# Patient Record
Sex: FEMALE | Employment: OTHER | URBAN - METROPOLITAN AREA
[De-identification: names, ages, dates, MRNs, and addresses within clinical notes are randomized per-mention and may not be internally consistent; named-entity substitution may affect disease eponyms.]

---

## 2017-06-12 ENCOUNTER — HOSPITAL ENCOUNTER (OUTPATIENT)
Dept: ONCOLOGY | Age: 82
Discharge: HOME | End: 2017-06-12

## 2017-06-12 ENCOUNTER — OFFICE VISIT (OUTPATIENT)
Dept: ONCOLOGY | Age: 82
End: 2017-06-12

## 2017-06-12 DIAGNOSIS — D46.9 MYELODYSPLASTIC SYNDROME (HCC): ICD-10-CM

## 2017-06-12 DIAGNOSIS — D63.1 ANEMIA OF CHRONIC RENAL FAILURE, STAGE 3 (MODERATE) (HCC): ICD-10-CM

## 2017-06-12 DIAGNOSIS — D72.819 CHRONIC LEUKOPENIA: ICD-10-CM

## 2017-06-12 DIAGNOSIS — D46.9 MYELODYSPLASTIC SYNDROME (HCC): Primary | ICD-10-CM

## 2017-06-12 DIAGNOSIS — E55.9 VITAMIN D DEFICIENCY: ICD-10-CM

## 2017-06-12 DIAGNOSIS — N18.30 ANEMIA OF CHRONIC RENAL FAILURE, STAGE 3 (MODERATE) (HCC): ICD-10-CM

## 2017-06-12 LAB
BASO+EOS+MONOS # BLD AUTO: 0.2 K/UL (ref 0–2.3)
BASO+EOS+MONOS # BLD AUTO: 5 % (ref 0.1–17)
DIFFERENTIAL METHOD BLD: ABNORMAL
ERYTHROCYTE [DISTWIDTH] IN BLOOD BY AUTOMATED COUNT: 12.1 % (ref 11.5–14.5)
HCT VFR BLD AUTO: 32.1 % (ref 36–48)
HGB BLD-MCNC: 10.5 G/DL (ref 12–16)
LYMPHOCYTES # BLD AUTO: 37 % (ref 14–44)
LYMPHOCYTES # BLD: 1.3 K/UL (ref 1.1–5.9)
MCH RBC QN AUTO: 33.5 PG (ref 25–35)
MCHC RBC AUTO-ENTMCNC: 32.7 G/DL (ref 31–37)
MCV RBC AUTO: 102.6 FL (ref 78–102)
NEUTS SEG # BLD: 2 K/UL (ref 1.8–9.5)
NEUTS SEG NFR BLD AUTO: 58 % (ref 40–70)
PLATELET # BLD AUTO: 157 K/UL (ref 140–440)
RBC # BLD AUTO: 3.13 M/UL (ref 4.1–5.1)
WBC # BLD AUTO: 3.5 K/UL (ref 4.5–13)

## 2017-06-12 NOTE — PATIENT INSTRUCTIONS
Anemia From Chronic Disease: Care Instructions  Your Care Instructions    Anemia is a low level of red blood cells, which carry oxygen from your lungs to the rest of your body. Sometimes when you have a long-term (chronic) disease such as kidney disease, arthritis, diabetes, cancer, or an infection, your body does not make enough red blood cells. Follow-up care is a key part of your treatment and safety. Be sure to make and go to all appointments, and call your doctor if you are having problems. It's also a good idea to know your test results and keep a list of the medicines you take. How can you care for yourself at home? · Follow your doctor's instructions to treat the chronic condition that is causing the anemia. · Take your medicine to treat your chronic condition exactly as prescribed. Call your doctor if you think you are having a problem with your medicine. · Take your medicine for anemia exactly as prescribed. Call your doctor if you think you are having a problem with your medicine. Medicines to increase the number of red blood cells (such as epoetin or darbepoetin) may be given as an injection. ¨ If you miss a dose, take it as soon as you can, unless it is almost time for your next dose. In that case, get back on your regular schedule and take only one dose. ¨ Do not freeze this medicine. Store it in the refrigerator. Do not shake the bottle before you prepare the shot. · Keep all your appointments for blood tests to check on your hemoglobin levels. When should you call for help? Call 911 anytime you think you may need emergency care. For example, call if:  · You passed out (lost consciousness). · You have symptoms of a heart attack, such as:  ¨ Chest pain or pressure. ¨ Sweating. ¨ Shortness of breath. ¨ Nausea or vomiting. ¨ Pain that spreads from the chest to the neck, jaw, or one or both shoulders or arms. ¨ Dizziness or lightheadedness. ¨ A fast or uneven pulse.   After calling 911, chew 1 adult-strength aspirin. Wait for an ambulance. Do not try to drive yourself. · You have a seizure. Call your doctor now or seek immediate medical care if:  · You have side effects of epoetin and darbepoetin, such as:  ¨ A headache. ¨ A fever. ¨ Diarrhea, nausea, or vomiting. ¨ Fatigue. ¨ Muscle or joint pain. ¨ A skin rash. · Your fatigue and weakness continue or get worse. Watch closely for changes in your health, and be sure to contact your doctor if you have any problems. Where can you learn more? Go to http://javier-marylu.info/. Enter E502 in the search box to learn more about \"Anemia From Chronic Disease: Care Instructions. \"  Current as of: October 13, 2016  Content Version: 11.2  © 9576-7772 [employer]. Care instructions adapted under license by [employer] (which disclaims liability or warranty for this information). If you have questions about a medical condition or this instruction, always ask your healthcare professional. [place] [address] any warranty or liability for your use of this information.

## 2017-06-12 NOTE — PROGRESS NOTES
Hematology/Oncology  Progress Note    Name: Steve   Date: 2017  : 4/3/1928  Primary Care Provider: Bard Kajal MD        Ms. Erling Gilford is a 80y.o. year old female who is following up for her Anemia associated with CRF: Stage 3, Vitamin Deficiency and Leukopenia. Current Therapy: Vitamin D 50,000 units, Procrit 60,000 units for hct < 30%. Subjective:      is in the office for a follow-up. Since her last clinic visit she has been doing well. Her appetite has improved and is reasonably well. She denies any significant fatigue or weakness. She denies recent fever or infections. Her weight remains stable. SHe does report some mild arthritis pain in her knees. She is continuing to use her cane for mobility support. She has no new concerns or complaints to report. The past medical, surgical and social history has been reviewed and remains unchanged. Past Medical History:   Diagnosis Date    Chronic diastolic heart failure (HCC)     Stable, mild edema, exertional SOB    Edema     Mild    Essential hypertension, benign     Better controlled    Hypertension     Shortness of breath     Improving, CHF better     Past Surgical History:   Procedure Laterality Date    HX GYN      hyst; abdominal hysterectomy    HX OTHER SURGICAL      goiter removed     Social History     Social History    Marital status:      Spouse name: N/A    Number of children: N/A    Years of education: N/A     Occupational History    Not on file.      Social History Main Topics    Smoking status: Never Smoker    Smokeless tobacco: Never Used    Alcohol use No    Drug use: No    Sexual activity: Not on file     Other Topics Concern    Not on file     Social History Narrative     Family History   Problem Relation Age of Onset    Diabetes Other     Hypertension Other      Positive family history for essential hypertension     Current Outpatient Prescriptions   Medication Sig Dispense Refill    ergocalciferol (ERGOCALCIFEROL) 50,000 unit capsule Take 1 Cap by mouth every seven (7) days. 12 Cap 0    nitrofurantoin (MACROBID) 100 mg capsule Take 1 Cap by mouth two (2) times daily (with meals). 14 Cap 0    aspirin (ASPIRIN LOW-STRENGTH) 81 mg chewable tablet Take 81 mg by mouth daily.  Hydrochlorothiazide 12.5 mg tablet Take 12.5 mg by mouth daily. PRN leg swelling.  valsartan-hydrochlorothiazide (DIOVAN HCT) 160-25 mg per tablet Take 1 Tab by mouth daily. Indications: HYPERTENSION      multivitamin (ONE A DAY) tablet Take 1 Tab by mouth daily. Indications: VITAMIN DEFICIENCY PREVENTION      diazepam (VALIUM) 2 mg tablet Take 2 mg by mouth as needed. Review of Systems  General :With exception of aforementioned;there is no physical distress evident. Ophthalmic:the patient denies having any visual impairment or eye discomfort. ENT: there are no abnormalities reported. Allergy and Immunology:the patient denies having any seasonal allergies or allergies to medications other than those already outlined above. Hematological and Lymphatic: the patient denies having any bruising, bleeding or lymphadenopathy. Endocrine: the patient denies having any heat or cold intolerance. There is no history of diabetes or thyroid disorders. Breast: the patient denies having any history of breast mass or lumps. Respiratory:the patient denies having any cough, shortness of breath, or dyspnea on exertion. Cardiovascular: there are no complaints of chest pain, palpitations, chest pounding, or dyspnea on exertion. Gastrointestinal: the patient denies having nausea, emesis, diarrhea, constipation, or blood in the stool. Genito-Urinary: the patient denies having urinary urgency, frequency, or dysuria. Musculoskeletal: with the exception of mild arthralgias the patient has no other musculoskeletal complaints.    Neurological:  denies having any numbness, tingling, or neurologic deficits. Dermatological: patient denies having any unexplained rash, skin ulcerations, or hives. Objective: There were no vitals taken for this visit. Pain Score: 1/10    Physical Exam:   General: Well appearing, in NAD  Skin: examination of the skin reveals no bruising, rash or petechiae  HEENT: Normocephalic, atraumatic. Conjunctiva and sclera are clear. Pupils are equal, round and reactive to light. EOMs are intact. ENT without oral mucosal lesions, stomatitis or thrush  Neck: supple without lymphadenopathy, JVD or thyromegaly  Lymphatics: no palpable cervical, supraclavicular, axillary or inguinal lymphadenopathy  Lungs: clear breath sounds bilaterally, no rhonchi or wheezes noted  Heart: Regular rate and rhythm, no murmurs, rubs or gallops, S1-S2 noted. Positive peripheral pulses bilaterally upper and lower extremities  Abdomen: soft, non-tender, non-distended, no HSM, positive bowel sounds  Extremities: without clubbing, cyanosis or edema  Neurologic: no focal deficits, steady gait, Alert and oriented x 3. Psychologic: mood and affect are appropriate, no anxiety or depression noted    Laboratory Data:     Results for orders placed or performed during the hospital encounter of 06/12/17   CBC WITH 3 PART DIFF     Status: Abnormal   Result Value Ref Range Status    WBC 3.5 (L) 4.5 - 13.0 K/uL Final    RBC 3.13 (L) 4.10 - 5.10 M/uL Final    HGB 10.5 (L) 12.0 - 16 g/dL Final    HCT 32.1 (L) 36 - 48 % Final    .6 (H) 78 - 102 FL Final    MCH 33.5 25.0 - 35.0 PG Final    MCHC 32.7 31 - 37 g/dL Final    RDW 12.1 11.5 - 14.5 % Final    PLATELET 428 999 - 064 K/uL Final    NEUTROPHILS 58 40 - 70 % Final    MIXED CELLS 5 0.1 - 17 % Final    LYMPHOCYTES 37 14 - 44 % Final    ABS. NEUTROPHILS 2.0 1.8 - 9.5 K/UL Final    ABS. MIXED CELLS 0.2 0.0 - 2.3 K/uL Final    ABS. LYMPHOCYTES 1.3 1.1 - 5.9 K/UL Final     Comment: Test performed at [address]  Results Reviewed by Medical Director. DF AUTOMATED   Final            Patient Active Problem List   Diagnosis Code    Essential hypertension, benign I10    Chronic diastolic heart failure (HCC) I50.32    Edema R60.9    Shortness of breath R06.02    Vitamin D deficiency E55.9    Anemia associated with chronic renal failure D63.1    Myelodysplastic syndrome (HCC) D46.9    Chronic leukopenia D72.819    Anemia of chronic renal failure, stage 3 (moderate) N18.3, D63.1         Assessment:     1. Myelodysplastic syndrome ([address]    2. Vitamin D deficiency    3. Chronic leukopenia    4. Anemia of chronic renal failure, stage 3 (moderate)        Plan:   Anemia associated with chronic kidney failure and myelodysplastic syndrome : I have explained to patient that her CBC from today reveals that her  WBC is stable at 3.5 with a hemoglobin of 10.5 g/dL and hematocrit of 32.1%. She will not require a Procrit injection at this time. I will check  CMP,Iron, and Ferritin level today. Vitamin D deficiency: Patient has completed Vit-D 50,000 units supplement  for her to take 1 po every week X 12 weeks during her previous visit. She previously completed her required therapy and the most recent vitamin D level was 37.4. I will recheck her values at this time and if they are sub-therapeutic then she will be restarted on vitamin D 50,000 units weekly for 12 weeks. Leukopenia: I have explained to patient that her WBC count is currently stable at 3.5. Her absolute neutrophil count is normal at 2 and the absolute lymphocyte count is normal at 1.3. Again, I have explained to the patient that therapeutic intervention is only required if the total WBC count is below 1 or if the absolute neutrophil count declines below 0.5. She will return to clinic for follow-up visit in 12 weeks. Follow-up Disposition:  Return in about 3 months (around 9/12/2017).    Orders Placed This Encounter    COMPLETE CBC & AUTO DIFF WBC    InHouse CBC ([facility]) Standing Status:   Future     Number of Occurrences:   1     Standing Expiration Date:   6/19/2017       Lucia Fernandez MD  6/12/2017

## 2017-06-12 NOTE — MR AVS SNAPSHOT
Visit Information Date & Time Provider Department Dept. Phone Encounter #  
 6/12/2017  1:30 PM Jailene Rose MD [facility] Medical Oncology [phone] [id number] Follow-up Instructions Return in about 3 months (around 9/12/2017). Your Appointments 9/13/2017 11:45 AM  
Office Visit with Jailene Rose MD  
[address]  Oncology [facility]) Appt Note: OV  
 [address], [address] [place] [address] Upcoming Health Maintenance Date Due DTaP/Tdap/Td series (1 - Tdap) 4/3/1949 ZOSTER VACCINE AGE 60> 4/3/1988 GLAUCOMA SCREENING Q2Y 4/3/1993 OSTEOPOROSIS SCREENING (DEXA) 4/3/1993 Pneumococcal 65+ High/Highest Risk (1 of 2 - PCV13) 4/3/1993 MEDICARE YEARLY EXAM 4/3/1993 INFLUENZA AGE 9 TO ADULT 8/1/2017 Allergies as of 6/12/2017  Review Complete On: 6/12/2017 By: Jailene Rose MD  
  
 Severity Noted Reaction Type Reactions Codeine High 09/24/2012   Systemic Drowsiness Current Immunizations  Never Reviewed No immunizations on file. Not reviewed this visit You Were Diagnosed With   
  
 Codes Comments Myelodysplastic syndrome ([facility] [address]    -  Primary ICD-10-CM: D46.9 ICD-9-CM: 238.75 Vitamin D deficiency     ICD-10-CM: E55.9 ICD-9-CM: 268.9 Chronic leukopenia     ICD-10-CM: D72.819 ICD-9-CM: 288.50 Anemia of chronic renal failure, stage 3 (moderate)     ICD-10-CM: N18.3, D63.1 ICD-9-CM: 285.21, 585.3 Vitals OB Status Smoking Status Hysterectomy Never Smoker Preferred Pharmacy Pharmacy Name Phone [facility], [address] [phone] Your Updated Medication List  
  
   
This list is accurate as of: 6/12/17  3:28 PM.  Always use your most recent med list.  
  
  
  
  
 ASPIRIN LOW-STRENGTH 81 mg chewable tablet Generic drug:  aspirin Take 81 mg by mouth daily. diazePAM 2 mg tablet Commonly known as:  VALIUM Take 2 mg by mouth as needed. DIOVAN -25 mg per tablet Generic drug:  valsartan-hydroCHLOROthiazide Take 1 Tab by mouth daily. Indications: HYPERTENSION  
  
 ergocalciferol 50,000 unit capsule Commonly known as:  ERGOCALCIFEROL Take 1 Cap by mouth every seven (7) days. hydroCHLOROthiazide 12.5 mg tablet Commonly known as:  HYDRODIURIL Take 12.5 mg by mouth daily. PRN leg swelling.  
  
 multivitamin tablet Commonly known as:  ONE A DAY Take 1 Tab by mouth daily. Indications: VITAMIN DEFICIENCY PREVENTION  
  
 nitrofurantoin 100 mg capsule Commonly known as:  MACRODANTIN Take 1 Cap by mouth two (2) times daily (with meals). We Performed the Following COMPLETE CBC & AUTO DIFF WBC [93955 CPT(R)] Follow-up Instructions Return in about 3 months (around 9/12/2017). To-Do List   
 06/12/2017 Lab:  CBC WITH 3 PART DIFF Patient Instructions Anemia From Chronic Disease: Care Instructions Your Care Instructions Anemia is a low level of red blood cells, which carry oxygen from your lungs to the rest of your body. Sometimes when you have a long-term (chronic) disease such as kidney disease, arthritis, diabetes, cancer, or an infection, your body does not make enough red blood cells. Follow-up care is a key part of your treatment and safety. Be sure to make and go to all appointments, and call your doctor if you are having problems. It's also a good idea to know your test results and keep a list of the medicines you take. How can you care for yourself at home? · Follow your doctor's instructions to treat the chronic condition that is causing the anemia.  
· Take your medicine to treat your chronic condition exactly as prescribed. Call your doctor if you think you are having a problem with your medicine. · Take your medicine for anemia exactly as prescribed. Call your doctor if you think you are having a problem with your medicine. Medicines to increase the number of red blood cells (such as epoetin or darbepoetin) may be given as an injection. ¨ If you miss a dose, take it as soon as you can, unless it is almost time for your next dose. In that case, get back on your regular schedule and take only one dose. ¨ Do not freeze this medicine. Store it in the refrigerator. Do not shake the bottle before you prepare the shot. · Keep all your appointments for blood tests to check on your hemoglobin levels. When should you call for help? Call 911 anytime you think you may need emergency care. For example, call if: 
· You passed out (lost consciousness). · You have symptoms of a heart attack, such as: ¨ Chest pain or pressure. ¨ Sweating. ¨ Shortness of breath. ¨ Nausea or vomiting. ¨ Pain that spreads from the chest to the neck, jaw, or one or both shoulders or arms. ¨ Dizziness or lightheadedness. ¨ A fast or uneven pulse. After calling 911, chew 1 adult-strength aspirin. Wait for an ambulance. Do not try to drive yourself. · You have a seizure. Call your doctor now or seek immediate medical care if: 
· You have side effects of epoetin and darbepoetin, such as: ¨ A headache. ¨ A fever. ¨ Diarrhea, nausea, or vomiting. ¨ Fatigue. ¨ Muscle or joint pain. ¨ A skin rash. · Your fatigue and weakness continue or get worse. Watch closely for changes in your health, and be sure to contact your doctor if you have any problems. Where can you learn more? Go to http://javier-marylu.info/. Enter E502 in the search box to learn more about \"Anemia From Chronic Disease: Care Instructions. \" Current as of: October 13, 2016 Content Version: 11.2 © 0529-9418 [employer]wise[employer]. Care instructions adapted under license by [employer] (which disclaims liability or warranty for this information). If you have questions about a medical condition or this instruction, always ask your healthcare professional. [address] any warranty or liability for your use of this information. Introducing [facility]! Wanda Hawkins introduces Daio patient portal. Now you can access parts of your medical record, email your doctor's office, and request medication refills online. 1. In your internet browser, go to [email or web address] 2. Click on the First Time User? Click Here link in the Sign In box. You will see the New Member Sign Up page. 3. Enter your Daio Access Code exactly as it appears below. You will not need to use this code after youve completed the sign-up process. If you do not sign up before the expiration date, you must request a new code. · Daio Access Code: [email or web address] Expires: 9/10/2017  1:52 PM 
 
4. Enter the last four digits of your Social Security Number (xxxx) and Date of Birth (mm/dd/yyyy) as indicated and click Submit. You will be taken to the next sign-up page. 5. Create a Daio ID. This will be your Daio login ID and cannot be changed, so think of one that is secure and easy to remember. 6. Create a Daio password. You can change your password at any time. 7. Enter your Password Reset Question and Answer. This can be used at a later time if you forget your password. 8. Enter your e-mail address. You will receive e-mail notification when new information is available in [address] 9. Click Sign Up. You can now view and download portions of your medical record. 10. Click the Download Summary menu link to download a portable copy of your medical information.  
 
If you have questions, please visit the Frequently Asked Questions section of the Adventi. Remember, Sense Platform[employer] is NOT to be used for urgent needs. For medical emergencies, dial 911. Now available from your iPhone and Android! Please provide this summary of care documentation to your next provider. Your primary care clinician is listed as PARIS SOUSA. If you have any questions after today's visit, please call [phone].

## 2017-06-13 VITALS
DIASTOLIC BLOOD PRESSURE: 69 MMHG | HEIGHT: 67 IN | WEIGHT: 154 LBS | SYSTOLIC BLOOD PRESSURE: 174 MMHG | HEART RATE: 55 BPM | BODY MASS INDEX: 24.17 KG/M2 | OXYGEN SATURATION: 100 % | TEMPERATURE: 97.9 F

## 2017-11-29 ENCOUNTER — HOSPITAL ENCOUNTER (OUTPATIENT)
Dept: LAB | Age: 82
Discharge: HOME | End: 2017-11-29
Payer: MEDICARE

## 2017-11-29 ENCOUNTER — HOSPITAL ENCOUNTER (OUTPATIENT)
Dept: ONCOLOGY | Age: 82
Discharge: HOME | End: 2017-11-29

## 2017-11-29 ENCOUNTER — OFFICE VISIT (OUTPATIENT)
Dept: ONCOLOGY | Age: 82
End: 2017-11-29

## 2017-11-29 VITALS
DIASTOLIC BLOOD PRESSURE: 71 MMHG | HEART RATE: 76 BPM | TEMPERATURE: 97.8 F | BODY MASS INDEX: 24.21 KG/M2 | SYSTOLIC BLOOD PRESSURE: 165 MMHG | WEIGHT: 154.6 LBS

## 2017-11-29 DIAGNOSIS — E55.9 VITAMIN D DEFICIENCY: ICD-10-CM

## 2017-11-29 DIAGNOSIS — D63.1 ANEMIA OF CHRONIC RENAL FAILURE, STAGE 3 (MODERATE) (HCC): ICD-10-CM

## 2017-11-29 DIAGNOSIS — N18.30 ANEMIA OF CHRONIC RENAL FAILURE, STAGE 3 (MODERATE) (HCC): ICD-10-CM

## 2017-11-29 DIAGNOSIS — D72.819 CHRONIC LEUKOPENIA: ICD-10-CM

## 2017-11-29 DIAGNOSIS — D46.9 MYELODYSPLASTIC SYNDROME (HCC): ICD-10-CM

## 2017-11-29 DIAGNOSIS — D46.9 MYELODYSPLASTIC SYNDROME (HCC): Primary | ICD-10-CM

## 2017-11-29 LAB
25(OH)D3 SERPL-MCNC: 33.7 NG/ML (ref 30–100)
ALBUMIN SERPL-MCNC: 3.8 G/DL (ref 3.4–5)
ALBUMIN/GLOB SERPL: 1.3 {RATIO} (ref 0.8–1.7)
ALP SERPL-CCNC: 48 U/L (ref 45–117)
ALT SERPL-CCNC: 14 U/L (ref 13–56)
ANION GAP SERPL CALC-SCNC: 12 MMOL/L (ref 3–18)
AST SERPL-CCNC: 19 U/L (ref 15–37)
BASO+EOS+MONOS # BLD AUTO: 0.2 K/UL (ref 0–2.3)
BASO+EOS+MONOS # BLD AUTO: 5 % (ref 0.1–17)
BILIRUB SERPL-MCNC: 0.8 MG/DL (ref 0.2–1)
BUN SERPL-MCNC: 28 MG/DL (ref 7–18)
BUN/CREAT SERPL: 17 (ref 12–20)
CALCIUM SERPL-MCNC: 8 MG/DL (ref 8.5–10.1)
CHLORIDE SERPL-SCNC: 108 MMOL/L (ref 100–108)
CO2 SERPL-SCNC: 24 MMOL/L (ref 21–32)
CREAT SERPL-MCNC: 1.67 MG/DL (ref 0.6–1.3)
DIFFERENTIAL METHOD BLD: ABNORMAL
ERYTHROCYTE [DISTWIDTH] IN BLOOD BY AUTOMATED COUNT: 12.4 % (ref 11.5–14.5)
FERRITIN SERPL-MCNC: 59 NG/ML (ref 8–388)
GLOBULIN SER CALC-MCNC: 2.9 G/DL (ref 2–4)
GLUCOSE SERPL-MCNC: 87 MG/DL (ref 74–99)
HCT VFR BLD AUTO: 32 % (ref 36–48)
HGB BLD-MCNC: 10.7 G/DL (ref 12–16)
IRON SATN MFR SERPL: 25 %
IRON SERPL-MCNC: 66 UG/DL (ref 50–175)
LYMPHOCYTES # BLD: 0.9 K/UL (ref 1.1–5.9)
LYMPHOCYTES NFR BLD: 23 % (ref 14–44)
MCH RBC QN AUTO: 35 PG (ref 25–35)
MCHC RBC AUTO-ENTMCNC: 33.4 G/DL (ref 31–37)
MCV RBC AUTO: 104.6 FL (ref 78–102)
NEUTS SEG # BLD: 2.7 K/UL (ref 1.8–9.5)
NEUTS SEG NFR BLD: 72 % (ref 40–70)
PLATELET # BLD AUTO: 159 K/UL (ref 140–440)
POTASSIUM SERPL-SCNC: 4.6 MMOL/L (ref 3.5–5.5)
PROT SERPL-MCNC: 6.7 G/DL (ref 6.4–8.2)
RBC # BLD AUTO: 3.06 M/UL (ref 4.1–5.1)
SODIUM SERPL-SCNC: 144 MMOL/L (ref 136–145)
TIBC SERPL-MCNC: 264 UG/DL (ref 250–450)
WBC # BLD AUTO: 3.8 K/UL (ref 4.5–13)

## 2017-11-29 PROCEDURE — 36415 COLL VENOUS BLD VENIPUNCTURE: CPT | Performed by: INTERNAL MEDICINE

## 2017-11-29 PROCEDURE — 83540 ASSAY OF IRON: CPT | Performed by: INTERNAL MEDICINE

## 2017-11-29 PROCEDURE — 82728 ASSAY OF FERRITIN: CPT | Performed by: INTERNAL MEDICINE

## 2017-11-29 PROCEDURE — 82306 VITAMIN D 25 HYDROXY: CPT | Performed by: INTERNAL MEDICINE

## 2017-11-29 PROCEDURE — 80053 COMPREHEN METABOLIC PANEL: CPT | Performed by: INTERNAL MEDICINE

## 2017-11-29 NOTE — PATIENT INSTRUCTIONS
Anemia From Chronic Disease: Care Instructions  Your Care Instructions    Anemia is a low level of red blood cells. Red blood cells carry oxygen from your lungs to the rest of your body. Sometimes when you have a long-term (chronic) disease, such as kidney disease, arthritis, diabetes, cancer, or an infection, your body does not make enough red blood cells. Follow-up care is a key part of your treatment and safety. Be sure to make and go to all appointments, and call your doctor if you are having problems. It's also a good idea to know your test results and keep a list of the medicines you take. How can you care for yourself at home? · Follow your doctor's instructions to treat the chronic condition that is causing the anemia. · Be safe with medicines. Take your medicine to treat your chronic condition exactly as prescribed. Call your doctor if you think you are having a problem with your medicine. · Take your medicine for anemia exactly as prescribed. Call your doctor if you think you are having a problem with your medicine. Medicines to increase the number of red blood cells (such as epoetin or darbepoetin) may be given as an injection. ¨ If you miss a dose, take it as soon as you can, unless it is almost time for your next dose. In that case, get back on your regular schedule and take only one dose. ¨ Do not freeze this medicine. Store it in the refrigerator. Do not shake the bottle before you prepare the shot. · Keep all your appointments for blood tests to check on your hemoglobin levels. When should you call for help? Call 911 anytime you think you may need emergency care. For example, call if:  ? · You passed out (lost consciousness). ?Call your doctor now or seek immediate medical care if:  ? · You are short of breath. ? · You are dizzy or light-headed, or you feel like you may faint. ? · You have new or worse bleeding. ? Watch closely for changes in your health, and be sure to contact your doctor if:  ? · You feel weaker or more tired than usual.   ? · You do not get better as expected. Where can you learn more? Go to http://javier-marylu.info/. Enter E502 in the search box to learn more about \"Anemia From Chronic Disease: Care Instructions. \"  Current as of: October 13, 2016  Content Version: 11.4  © 9919-4583 [employer]. Care instructions adapted under license by [employer] (which disclaims liability or warranty for this information). If you have questions about a medical condition or this instruction, always ask your healthcare professional. [place] [address] any warranty or liability for your use of this information.

## 2017-11-29 NOTE — PROGRESS NOTES
Hematology/Oncology  Progress Note    Name: Nish Canales  Date: 2017  : 4/3/1928  Primary Care Provider: Zain Vicente MD        Ms. Kandice Walker is a 80y.o. year old female who is following up for her Anemia associated with CRF: Stage 3, Vitamin Deficiency and Leukopenia. Current Therapy: Vitamin D 50,000 units, Procrit 60,000 units for hct < 30%. Subjective:      is an 80-year-old woman who has iron deficiency anemia and anemia associated with chronic kidney disease. She is in the office for a follow-up. Since her last clinic visit she has been doing well. Her appetite has improved and is reasonably well. She denies any significant fatigue or weakness. She denies recent fever or infections. Her weight remains stable. She does report some mild arthritis pain in her knees. She is continuing to use her cane for mobility support. She has no new concerns or complaints to report. The past medical, surgical and social history has been reviewed and remains unchanged. Past Medical History:   Diagnosis Date    Chronic diastolic heart failure (HCC)     Stable, mild edema, exertional SOB    Edema     Mild    Essential hypertension, benign     Better controlled    Hypertension     Shortness of breath     Improving, CHF better     Past Surgical History:   Procedure Laterality Date    HX GYN      hyst; abdominal hysterectomy    HX OTHER SURGICAL      goiter removed     Social History     Social History    Marital status:      Spouse name: N/A    Number of children: N/A    Years of education: N/A     Occupational History    Not on file.      Social History Main Topics    Smoking status: Never Smoker    Smokeless tobacco: Never Used    Alcohol use No    Drug use: No    Sexual activity: Not on file     Other Topics Concern    Not on file     Social History Narrative     Family History   Problem Relation Age of Onset    Diabetes Other     Hypertension Other Positive family history for essential hypertension     Current Outpatient Prescriptions   Medication Sig Dispense Refill    ergocalciferol (ERGOCALCIFEROL) 50,000 unit capsule Take 1 Cap by mouth every seven (7) days. 12 Cap 0    nitrofurantoin (MACROBID) 100 mg capsule Take 1 Cap by mouth two (2) times daily (with meals). 14 Cap 0    aspirin (ASPIRIN LOW-STRENGTH) 81 mg chewable tablet Take 81 mg by mouth daily.  Hydrochlorothiazide 12.5 mg tablet Take 12.5 mg by mouth daily. PRN leg swelling.  valsartan-hydrochlorothiazide (DIOVAN HCT) 160-25 mg per tablet Take 1 Tab by mouth daily. Indications: HYPERTENSION      multivitamin (ONE A DAY) tablet Take 1 Tab by mouth daily. Indications: VITAMIN DEFICIENCY PREVENTION      diazepam (VALIUM) 2 mg tablet Take 2 mg by mouth as needed. Review of Systems  General :With exception of aforementioned;there is no physical distress evident. Ophthalmic:the patient denies having any visual impairment or eye discomfort. ENT: there are no abnormalities reported. Allergy and Immunology:the patient denies having any seasonal allergies or allergies to medications other than those already outlined above. Hematological and Lymphatic: the patient denies having any bruising, bleeding or lymphadenopathy. Endocrine: the patient denies having any heat or cold intolerance. There is no history of diabetes or thyroid disorders. Breast: the patient denies having any history of breast mass or lumps. Respiratory:the patient denies having any cough, shortness of breath, or dyspnea on exertion. Cardiovascular: there are no complaints of chest pain, palpitations, chest pounding, or dyspnea on exertion. Gastrointestinal: the patient denies having nausea, emesis, diarrhea, constipation, or blood in the stool. Genito-Urinary: the patient denies having urinary urgency, frequency, or dysuria.    Musculoskeletal: with the exception of mild arthralgias the patient has no other musculoskeletal complaints. Neurological:  denies having any numbness, tingling, or neurologic deficits. Dermatological: patient denies having any unexplained rash, skin ulcerations, or hives. Objective:     Visit Vitals    /71 (BP 1 Location: Right arm, BP Patient Position: Sitting)    Pulse 76    Temp 97.8 °F (36.6 °C) (Oral)    Wt 70.1 kg (154 lb 9.6 oz)    BMI 24.21 kg/m2     Pain Score: 1/10    Physical Exam:   General: Well appearing, in NAD  Skin: examination of the skin reveals no bruising, rash or petechiae  HEENT: Normocephalic, atraumatic. Conjunctiva and sclera are clear. Pupils are equal, round and reactive to light. EOMs are intact. ENT without oral mucosal lesions, stomatitis or thrush  Neck: supple without lymphadenopathy, JVD or thyromegaly  Lymphatics: no palpable cervical, supraclavicular, axillary or inguinal lymphadenopathy  Lungs: clear breath sounds bilaterally, no rhonchi or wheezes noted  Heart: Regular rate and rhythm, no murmurs, rubs or gallops, S1-S2 noted. Positive peripheral pulses bilaterally upper and lower extremities  Abdomen: soft, non-tender, non-distended, no HSM, positive bowel sounds  Extremities: without clubbing, cyanosis or edema  Neurologic: no focal deficits, steady gait, Alert and oriented x 3.   Psychologic: mood and affect are appropriate, no anxiety or depression noted    Laboratory Data:     Results for orders placed or performed during the hospital encounter of 11/29/17   CBC WITH 3 PART DIFF     Status: Abnormal   Result Value Ref Range Status    WBC 3.8 (L) 4.5 - 13.0 K/uL Final    RBC 3.06 (L) 4.10 - 5.10 M/uL Final    HGB 10.7 (L) 12.0 - 16 g/dL Final    HCT 32.0 (L) 36 - 48 % Final    .6 (H) 78 - 102 FL Final    MCH 35.0 25.0 - 35.0 PG Final    MCHC 33.4 31 - 37 g/dL Final    RDW 12.4 11.5 - 14.5 % Final    PLATELET 502 098 - 964 K/uL Final    NEUTROPHILS 72 (H) 40 - 70 % Final    MIXED CELLS 5 0.1 - 17 % Final LYMPHOCYTES 23 14 - 44 % Final    ABS. NEUTROPHILS 2.7 1.8 - 9.5 K/UL Final    ABS. MIXED CELLS 0.2 0.0 - 2.3 K/uL Final    ABS. LYMPHOCYTES 0.9 (L) 1.1 - 5.9 K/UL Final     Comment: Test performed at [place] [address] Location. Results Reviewed by Medical Director. DF AUTOMATED   Final            Patient Active Problem List   Diagnosis Code    Essential hypertension, benign I10    Chronic diastolic heart failure (HCC) I50.32    Edema R60.9    Shortness of breath R06.02    Vitamin D deficiency E55.9    Anemia associated with chronic renal failure D63.1    Myelodysplastic syndrome (HCC) D46.9    Chronic leukopenia D72.819    Anemia of chronic renal failure, stage 3 (moderate) N18.3, D63.1         Assessment:     1. Myelodysplastic syndrome ([facility] [address]    2. Vitamin D deficiency    3. Anemia of chronic renal failure, stage 3 (moderate)    4. Chronic leukopenia        Plan:   Anemia associated with chronic kidney failure and myelodysplastic syndrome : I have explained to patient that her CBC from today reveals that her  WBC is stable at 3.8, the hemoglobin is 10.7 g/dL, and hematocrit is 32%. She will not require a Procrit injection at this time. I will check  CMP,Iron, and Ferritin level today. Vitamin D deficiency: The patient has previously completed Vit-D 50,000 units supplements. I will recheck her values at this time and if they are sub-therapeutic then she will be restarted on vitamin D 50,000 units weekly for 12 weeks. Leukopenia: I have explained to patient that her WBC count is currently stable at 3.8  Her absolute neutrophil count is normal at 2.7 and the absolute lymphocyte count is normal at 0.9. Again, I have explained to the patient that therapeutic intervention is only required if the total WBC count is below 1 or if the absolute neutrophil count declines below 0.5. She will return to clinic for follow-up visit in 12 weeks.       Follow-up Disposition:  Return in about 6 months (around 5/29/2018). Orders Placed This Encounter    COMPLETE CBC & AUTO DIFF WBC    InHouse CBC (Pump!)     Standing Status:   Future     Number of Occurrences:   1     Standing Expiration Date:   54/1/8659    METABOLIC PANEL, COMPREHENSIVE     Standing Status:   Future     Standing Expiration Date:   11/30/2018    IRON PROFILE     Standing Status:   Future     Standing Expiration Date:   11/30/2018    FERRITIN     Standing Status:   Future     Standing Expiration Date:   11/30/2018    VITAMIN D, 25 HYDROXY     Standing Status:   Future     Standing Expiration Date:   11/30/2018       Celestino Olivares MD  11/29/2017

## 2017-11-29 NOTE — MR AVS SNAPSHOT
Visit Information Date & Time Provider Department Dept. Phone Encounter #  
 11/29/2017 10:45 AM Michelle Ryan MD [facility] Oncology [phone] [id number] Follow-up Instructions Return in about 6 months (around 5/29/2018). Your Appointments 5/30/2018 11:30 AM  
Office Visit with Michelle Ryan MD  
[address]  Oncology [facility]) Appt Note: 6 MONTHS; [address], [place] [address], [address] [place] [address] Upcoming Health Maintenance Date Due DTaP/Tdap/Td series (1 - Tdap) 4/3/1949 ZOSTER VACCINE AGE 60> 2/3/1988 GLAUCOMA SCREENING Q2Y 4/3/1993 OSTEOPOROSIS SCREENING (DEXA) 4/3/1993 Pneumococcal 65+ High/Highest Risk (1 of 2 - PCV13) 4/3/1993 MEDICARE YEARLY EXAM 4/3/1993 Influenza Age 5 to Adult 8/1/2017 Allergies as of 11/29/2017  Review Complete On: 6/12/2017 By: Michelle Ryan MD  
  
 Severity Noted Reaction Type Reactions Codeine High 09/24/2012   Systemic Drowsiness Current Immunizations  Never Reviewed No immunizations on file. Not reviewed this visit You Were Diagnosed With   
  
 Codes Comments Myelodysplastic syndrome ([facility] [address]    -  Primary ICD-10-CM: D46.9 ICD-9-CM: 238.75 Vitamin D deficiency     ICD-10-CM: E55.9 ICD-9-CM: 268.9 Anemia of chronic renal failure, stage 3 (moderate)     ICD-10-CM: N18.3, D63.1 ICD-9-CM: 285.21, 585.3 Chronic leukopenia     ICD-10-CM: D72.819 ICD-9-CM: 288.50 Vitals BP Pulse Temp Weight(growth percentile) BMI OB Status 165/71 (BP 1 Location: Right arm, BP Patient Position: Sitting) 76 97.8 °F (36.6 °C) (Oral) 154 lb 9.6 oz (70.1 kg) 24.21 kg/m2 Hysterectomy Smoking Status Never Smoker Vitals History BMI and BSA Data Body Mass Index Body Surface Area  
 24.21 kg/m 2 1.82 m 2 Preferred Pharmacy Pharmacy Name Phone [facility], [facility] [phone] Your Updated Medication List  
  
   
This list is accurate as of: 11/29/17 12:04 PM.  Always use your most recent med list.  
  
  
  
  
 ASPIRIN LOW-STRENGTH 81 mg chewable tablet Generic drug:  aspirin Take 81 mg by mouth daily. diazePAM 2 mg tablet Commonly known as:  VALIUM Take 2 mg by mouth as needed. DIOVAN -25 mg per tablet Generic drug:  valsartan-hydroCHLOROthiazide Take 1 Tab by mouth daily. Indications: HYPERTENSION  
  
 ergocalciferol 50,000 unit capsule Commonly known as:  ERGOCALCIFEROL Take 1 Cap by mouth every seven (7) days. hydroCHLOROthiazide 12.5 mg tablet Commonly known as:  HYDRODIURIL Take 12.5 mg by mouth daily. PRN leg swelling.  
  
 multivitamin tablet Commonly known as:  ONE A DAY Take 1 Tab by mouth daily. Indications: VITAMIN DEFICIENCY PREVENTION  
  
 nitrofurantoin 100 mg capsule Commonly known as:  MACRODANTIN Take 1 Cap by mouth two (2) times daily (with meals). We Performed the Following COMPLETE CBC & AUTO DIFF WBC [32598 CPT(R)] Follow-up Instructions Return in about 6 months (around 5/29/2018). To-Do List   
 11/29/2017 Lab:  CBC WITH 3 PART DIFF Patient Instructions Anemia From Chronic Disease: Care Instructions Your Care Instructions Anemia is a low level of red blood cells. Red blood cells carry oxygen from your lungs to the rest of your body. Sometimes when you have a long-term (chronic) disease, such as kidney disease, arthritis, diabetes, cancer, or an infection, your body does not make enough red blood cells. Follow-up care is a key part of your treatment and safety.  Be sure to make and go to all appointments, and call your doctor if you are having problems. It's also a good idea to know your test results and keep a list of the medicines you take. How can you care for yourself at home? · Follow your doctor's instructions to treat the chronic condition that is causing the anemia. · Be safe with medicines. Take your medicine to treat your chronic condition exactly as prescribed. Call your doctor if you think you are having a problem with your medicine. · Take your medicine for anemia exactly as prescribed. Call your doctor if you think you are having a problem with your medicine. Medicines to increase the number of red blood cells (such as epoetin or darbepoetin) may be given as an injection. ¨ If you miss a dose, take it as soon as you can, unless it is almost time for your next dose. In that case, get back on your regular schedule and take only one dose. ¨ Do not freeze this medicine. Store it in the refrigerator. Do not shake the bottle before you prepare the shot. · Keep all your appointments for blood tests to check on your hemoglobin levels. When should you call for help? Call 911 anytime you think you may need emergency care. For example, call if: 
? · You passed out (lost consciousness). ?Call your doctor now or seek immediate medical care if: 
? · You are short of breath. ? · You are dizzy or light-headed, or you feel like you may faint. ? · You have new or worse bleeding. ? Watch closely for changes in your health, and be sure to contact your doctor if: 
? · You feel weaker or more tired than usual.  
? · You do not get better as expected. Where can you learn more? Go to http://javier-marylu.info/. Enter E502 in the search box to learn more about \"Anemia From Chronic Disease: Care Instructions. \" Current as of: October 13, 2016 Content Version: 11.4 © 9898-7040 Healthwise, [employer].  Care instructions adapted under license by [employer] (which disclaims liability or warranty for this information). If you have questions about a medical condition or this instruction, always ask your healthcare professional. [address] any warranty or liability for your use of this information. Introducing [facility]! [facility] introduces [employer] patient portal. Now you can access parts of your medical record, email your doctor's office, and request medication refills online. 1. In your internet browser, go to [email or web address] 2. Click on the First Time User? Click Here link in the Sign In box. You will see the New Member Sign Up page. 3. Enter your [employer] Access Code exactly as it appears below. You will not need to use this code after youve completed the sign-up process. If you do not sign up before the expiration date, you must request a new code. · [employer] Access Code: [email or web address] Expires: 2/27/2018 10:55 AM 
 
4. Enter the last four digits of your Social Security Number (xxxx) and Date of Birth (mm/dd/yyyy) as indicated and click Submit. You will be taken to the next sign-up page. 5. Create a [employer] ID. This will be your [employer] login ID and cannot be changed, so think of one that is secure and easy to remember. 6. Create a [employer] password. You can change your password at any time. 7. Enter your Password Reset Question and Answer. This can be used at a later time if you forget your password. 8. Enter your e-mail address. You will receive e-mail notification when new information is available in [address] 9. Click Sign Up. You can now view and download portions of your medical record. 10. Click the Download Summary menu link to download a portable copy of your medical information. If you have questions, please visit the Frequently Asked Questions section of the [employer] website. Remember, [employer] is NOT to be used for urgent needs. For medical emergencies, dial 911. Now available from your iPhone and Android! Please provide this summary of care documentation to your next provider. Your primary care clinician is listed as Sariah Deleon. If you have any questions after today's visit, please call [phone].

## 2017-12-01 ENCOUNTER — TELEPHONE (OUTPATIENT)
Dept: ONCOLOGY | Age: 82
End: 2017-12-01

## 2018-01-10 ENCOUNTER — HOSPITAL ENCOUNTER (OUTPATIENT)
Dept: LAB | Age: 83
Discharge: HOME | End: 2018-01-10
Payer: MEDICARE

## 2018-01-10 ENCOUNTER — RESEARCH ENCOUNTER (OUTPATIENT)
Dept: FAMILY MEDICINE CLINIC | Age: 83
End: 2018-01-10

## 2018-01-10 ENCOUNTER — OFFICE VISIT (OUTPATIENT)
Dept: FAMILY MEDICINE CLINIC | Age: 83
End: 2018-01-10

## 2018-01-10 VITALS
RESPIRATION RATE: 20 BRPM | HEIGHT: 67 IN | BODY MASS INDEX: 21.97 KG/M2 | DIASTOLIC BLOOD PRESSURE: 67 MMHG | WEIGHT: 140 LBS | HEART RATE: 82 BPM | OXYGEN SATURATION: 96 % | TEMPERATURE: 98.3 F | SYSTOLIC BLOOD PRESSURE: 102 MMHG

## 2018-01-10 DIAGNOSIS — R53.83 FATIGUE, UNSPECIFIED TYPE: Primary | ICD-10-CM

## 2018-01-10 DIAGNOSIS — R63.0 DECREASED APPETITE: ICD-10-CM

## 2018-01-10 DIAGNOSIS — R05.9 COUGH: ICD-10-CM

## 2018-01-10 DIAGNOSIS — R82.90 URINE ABNORMALITY: ICD-10-CM

## 2018-01-10 DIAGNOSIS — R05.9 COUGH: Primary | ICD-10-CM

## 2018-01-10 LAB
BILIRUB UR QL STRIP: NORMAL
GLUCOSE UR-MCNC: NEGATIVE MG/DL
KETONES P FAST UR STRIP-MCNC: NEGATIVE MG/DL
PH UR STRIP: 5 [PH] (ref 4.6–8)
PROT UR QL STRIP: NORMAL
QUICKVUE INFLUENZA TEST: NEGATIVE
SP GR UR STRIP: 1.03 (ref 1–1.03)
UA UROBILINOGEN AMB POC: NORMAL (ref 0.2–1)
URINALYSIS CLARITY POC: NORMAL
URINALYSIS COLOR POC: YELLOW
URINE BLOOD POC: NORMAL
URINE LEUKOCYTES POC: NORMAL
URINE NITRITES POC: NEGATIVE
VALID INTERNAL CONTROL?: YES

## 2018-01-10 PROCEDURE — 87086 URINE CULTURE/COLONY COUNT: CPT | Performed by: NURSE PRACTITIONER

## 2018-01-10 NOTE — PROGRESS NOTES
1/10/2018  3:00 PM    Chief Complaint   Patient presents with   [address]       Patient here today for office visit; however, EMR issues prohibit the opening of an office visit encounter at this time. Therefore, a research encounter has been opened to evaluate past medical charts. Also, orders placed for cxray to be completed and urine culture to be sent.

## 2018-01-10 NOTE — PROGRESS NOTES
OFFICE NOTE    Michael Null is a 80 y.o. female presenting today for office visit. 1/10/2018  3:10 PM  *note that documentation was completed after visit due to EMR downtime      Chief Complaint   Patient presents with    Cold Symptoms     pt here with c/o cold symptoms and not feeling good         HPI: Here today for several complaints. PCP Hemant Pate MD. Reports that she has been feeling fatigued, decreased appetite, cough, and some chest congestion that has been happening since before Christmas. Reports that she was diagnosed with bronchitis after Christmas by her PCP and was placed on Doxycyline and Tessalon Perles- she reports that she completed the AB but still has not really be feeling better. States that some of her symptoms resolved but still has the above symptoms persisting. Has not been doing anything else for symptoms. Was around sick contacts when she got sick. Did not get flu shot this year. Review of Systems   Constitutional: Positive for appetite change and fatigue. Negative for chills and fever. HENT: Positive for congestion. Negative for ear pain, facial swelling, postnasal drip, rhinorrhea, sinus pain, sinus pressure, sneezing and sore throat. Eyes: Negative for pain, discharge, itching and visual disturbance. Respiratory: Positive for cough. Negative for shortness of breath and wheezing. Cardiovascular: Negative for chest pain. Gastrointestinal: Negative for abdominal pain, diarrhea, nausea and vomiting. Genitourinary: Negative for difficulty urinating, dysuria and frequency. Musculoskeletal: Negative for arthralgias and myalgias. Skin: Negative for rash. Allergic/Immunologic: Negative for environmental allergies. Neurological: Negative for headaches.          PHQ Screening   PHQ over the last two weeks 1/10/2018   Little interest or pleasure in doing things Not at all   Feeling down, depressed or hopeless Not at all   Total Score PHQ 2 0 History  Past Medical History:   Diagnosis Date    Chronic diastolic heart failure (HCC)     Stable, mild edema, exertional SOB    Edema     Mild    Essential hypertension, benign     Better controlled    Hypertension     Shortness of breath     Improving, CHF better       Past Surgical History:   Procedure Laterality Date    HX GYN      hyst; abdominal hysterectomy    HX OTHER SURGICAL      goiter removed       Social History     Social History    Marital status:      Spouse name: N/A    Number of children: N/A    Years of education: N/A     Occupational History    Not on file. Social History Main Topics    Smoking status: Never Smoker    Smokeless tobacco: Never Used    Alcohol use No    Drug use: No    Sexual activity: Not on file     Other Topics Concern    Not on file     Social History Narrative       Allergies   Allergen Reactions    Codeine Drowsiness       Current Outpatient Prescriptions   Medication Sig Dispense Refill    ergocalciferol (ERGOCALCIFEROL) 50,000 unit capsule Take 1 Cap by mouth every seven (7) days. 12 Cap 0    aspirin (ASPIRIN LOW-STRENGTH) 81 mg chewable tablet Take 81 mg by mouth daily.  valsartan-hydrochlorothiazide (DIOVAN HCT) 160-25 mg per tablet Take 1 Tab by mouth daily. Indications: HYPERTENSION      multivitamin (ONE A DAY) tablet Take 1 Tab by mouth daily. Indications: VITAMIN DEFICIENCY PREVENTION      diazepam (VALIUM) 2 mg tablet Take 2 mg by mouth as needed.  Hydrochlorothiazide 12.5 mg tablet Take 12.5 mg by mouth daily. PRN leg swelling.            Patient Care Team:  Patient Care Team:  Johanny Rowan MD as PCP - [facility])        LABS:  Results for orders placed or performed in visit on 01/10/18   AMB POC URINALYSIS DIP STICK AUTO W/O MICRO   Result Value Ref Range    Color (UA POC) Yellow     Clarity (UA POC) Cloudy     Glucose (UA POC) Negative Negative    Bilirubin (UA POC) 1+ Negative Ketones (UA POC) Negative Negative    Specific gravity (UA POC) 1.030 1.001 - 1.035    Blood (UA POC) Trace Negative    pH (UA POC) 5.0 4.6 - 8.0    Protein (UA POC) 3+ Negative    Urobilinogen (UA POC) 0.2 mg/dL 0.2 - 1    Nitrites (UA POC) Negative Negative    Leukocyte esterase (UA POC) 1+ Negative   AMB POC RAPID INFLUENZA TEST   Result Value Ref Range    VALID INTERNAL CONTROL POC Yes     QuickVue Influenza test Negative Negative       RADIOLOGY:    *Cxray done today in office- no acute findings noted at this time- but will await final radiology reading      Physical Exam   Constitutional: She is oriented to person, place, and time. She appears well-developed. No distress. HENT:   Right Ear: External ear and ear canal normal.   Left Ear: External ear and ear canal normal.   Nose: Nose normal. No mucosal edema or rhinorrhea. Right sinus exhibits no maxillary sinus tenderness and no frontal sinus tenderness. Left sinus exhibits no maxillary sinus tenderness and no frontal sinus tenderness. Mouth/Throat: Uvula is midline, oropharynx is clear and moist and mucous membranes are normal. No oropharyngeal exudate or posterior oropharyngeal erythema.   -cerumen in bilateral ear canals impeding visualization of TMs   Eyes: EOM are normal. Pupils are equal, round, and reactive to light. Right eye exhibits no discharge. Left eye exhibits no discharge. Neck: Normal range of motion. Neck supple. Cardiovascular: Normal rate, regular rhythm and normal heart sounds. No murmur heard. Pulmonary/Chest: Effort normal and breath sounds normal. No respiratory distress. Abdominal: Soft. Bowel sounds are normal. There is no tenderness. Lymphadenopathy:     She has no cervical adenopathy. Neurological: She is alert and oriented to person, place, and time. +walks with cane, gait steady   Skin: Skin is warm and dry. No rash noted.          Vitals:    01/10/18 1731   BP: 102/67   Pulse: 82   Resp: 20   Temp: 98.3 °F (36.8 °C)   TempSrc: Oral   SpO2: 96%   Weight: 140 lb (63.5 kg)   Height: 5' 7\" (1.702 m)         Assessment and Plan    Fatigue, unspecified type/ Decreased appetite  *Discussed with patient about possible causes including lingering effects of recent bronchitis. Advised on negative influenza testing, negative preliminary cxray, and equivocal U/A. Urine culture ordered for further evaluation. *Advised patient to continue to rest, drink adequate fluids, and attempt eating- eat high calorie when possible. *Advised her that if urine culture is negative and she continues to feel bad, consideration toward labwork will be made. *Cxray and urine culture orders placed in other encounter due to EMR IT issues. - AMB POC URINALYSIS DIP STICK AUTO W/O MICRO  - AMB POC RAPID INFLUENZA TEST    Cough  *As above. Continue with PRN Tessalon Perles. Cxray preliminary negative. *Plan of care reviewed with patient. Patient in agreement with plan and expresses understanding. All questions answered and patient encouraged to call or RTO if further questions or concerns. Follow-up Disposition:  Return if symptoms worsen or fail to improve.

## 2018-01-10 NOTE — PATIENT INSTRUCTIONS
Fatigue: Care Instructions  Your Care Instructions    Fatigue is a feeling of tiredness, exhaustion, or lack of energy. You may feel fatigue because of too much or not enough activity. It can also come from stress, lack of sleep, boredom, and poor diet. Many medical problems, such as viral infections, can cause fatigue. Emotional problems, especially depression, are often the cause of fatigue. Fatigue is most often a symptom of another problem. Treatment for fatigue depends on the cause. For example, if you have fatigue because you have a certain health problem, treating this problem also treats your fatigue. If depression or anxiety is the cause, treatment may help. Follow-up care is a key part of your treatment and safety. Be sure to make and go to all appointments, and call your doctor if you are having problems. It's also a good idea to know your test results and keep a list of the medicines you take. How can you care for yourself at home? · Get regular exercise. But don't overdo it. Go back and forth between rest and exercise. · Get plenty of rest.  · Eat a healthy diet. Do not skip meals, especially breakfast.  · Reduce your use of caffeine, tobacco, and alcohol. Caffeine is most often found in coffee, tea, cola drinks, and chocolate. · Limit medicines that can cause fatigue. This includes tranquilizers and cold and allergy medicines. When should you call for help? Watch closely for changes in your health, and be sure to contact your doctor if:  ? · You have new symptoms such as fever or a rash. ? · Your fatigue gets worse. ? · You have been feeling down, depressed, or hopeless. Or you may have lost interest in things that you usually enjoy. ? · You are not getting better as expected. Where can you learn more? Go to http://javier-marylu.info/. Enter E671 in the search box to learn more about \"Fatigue: Care Instructions. \"  Current as of: March 20, 2017  Content Version: 11.4  © 7070-1179 [employer]. Care instructions adapted under license by [employer] (which disclaims liability or warranty for this information). If you have questions about a medical condition or this instruction, always ask your healthcare professional. [address] any warranty or liability for your use of this information. Cough: Care Instructions  Your Care Instructions    A cough is your body's response to something that bothers your throat or airways. Many things can cause a cough. You might cough because of a cold or the flu, bronchitis, or asthma. Smoking, postnasal drip, allergies, and stomach acid that backs up into your throat also can cause coughs. A cough is a symptom, not a disease. Most coughs stop when the cause, such as a cold, goes away. You can take a few steps at home to cough less and feel better. Follow-up care is a key part of your treatment and safety. Be sure to make and go to all appointments, and call your doctor if you are having problems. It's also a good idea to know your test results and keep a list of the medicines you take. How can you care for yourself at home? · Drink lots of water and other fluids. This helps thin the mucus and soothes a dry or sore throat. Honey or lemon juice in hot water or tea may ease a dry cough. · Take cough medicine as directed by your doctor. · Prop up your head on pillows to help you breathe and ease a dry cough. · Try cough drops to soothe a dry or sore throat. Cough drops don't stop a cough. Medicine-flavored cough drops are no better than candy-flavored drops or hard candy. · Do not smoke. Avoid secondhand smoke. If you need help quitting, talk to your doctor about stop-smoking programs and medicines. These can increase your chances of quitting for good. When should you call for help? Call 911 anytime you think you may need emergency care.  For example, call if:  ? · You have severe trouble breathing. ?Call your doctor now or seek immediate medical care if:  ? · You cough up blood. ? · You have new or worse trouble breathing. ? · You have a new or higher fever. ? · You have a new rash. ? Watch closely for changes in your health, and be sure to contact your doctor if:  ? · You cough more deeply or more often, especially if you notice more mucus or a change in the color of your mucus. ? · You have new symptoms, such as a sore throat, an earache, or sinus pain. ? · You do not get better as expected. Where can you learn more? Go to http://javier-marylu.info/. Enter D279 in the search box to learn more about \"Cough: Care Instructions. \"  Current as of: May 12, 2017  Content Version: 11.4  © 5623-2982 [employer]. Care instructions adapted under license by [employer] (which disclaims liability or warranty for this information). If you have questions about a medical condition or this instruction, always ask your healthcare professional. [address] any warranty or liability for your use of this information.

## 2018-01-12 ENCOUNTER — TELEPHONE (OUTPATIENT)
Dept: FAMILY MEDICINE CLINIC | Age: 83
End: 2018-01-12

## 2018-01-12 LAB
BACTERIA SPEC CULT: NORMAL
SERVICE CMNT-IMP: NORMAL

## 2018-01-12 NOTE — TELEPHONE ENCOUNTER
1/12/2018  6:36 PM    Chief Complaint   Patient presents with    Results       Noted results of xray and urine culture that was ordered from visit earlier this week in office. Attempted to contact patient to review results and see how she was feeling. LM requesting call back if not feeling better and briefly informed that results were normal.         Results for orders placed or performed during the hospital encounter of 01/10/18   CULTURE, URINE   Result Value Ref Range    Special Requests: NO SPECIAL REQUESTS      Culture result: NO GROWTH 2 DAYS           Results from Appointment encounter on 01/10/18   XR CHEST PA LAT   Narrative EXAMINATION: Chest 2 views    INDICATION: Persistent cough    COMPARISON: 9/19/2012    FINDINGS: Frontal and lateral views of the chest obtained. No consolidation. Mediastinal silhouette and pulmonary vasculature unremarkable. No evidence of  pneumothorax. No acute osseous findings. Impression IMPRESSION:    1. No acute findings.

## 2018-01-19 ENCOUNTER — TELEPHONE (OUTPATIENT)
Dept: FAMILY MEDICINE CLINIC | Age: 83
End: 2018-01-19

## 2018-01-19 NOTE — TELEPHONE ENCOUNTER
1/19/2018  5:54 PM    Chief Complaint   Patient presents with    Fatigue    Lethargy       Noted continued complaints from patient related to feeling fatigue and weak. Noted that her cold symptoms have resolved since being seen on 1/10. She had a normal urine culture and cxray. She was afebrile at that time. Due to cold symptoms, it was felt to be viral related. During our office visit, I had discussed with her about doing labs if she still was not feeling well. Since she is continuing to experience fatigue, recommend office visit to have further evaluation and management. She was looking to establish care here anyways, so this would be an opportunity for this as well- forwarded to clinical staff to schedule patient for appointment with either myself or Dr Jason Marley- whoever she would like to be her PCP.

## 2018-01-19 NOTE — TELEPHONE ENCOUNTER
Call made to pt. Pt states she no longer had cough or cold, just still feels weak and lifeless, being that she is an energetic person, she doesn't have any energy. Please advise.

## 2018-01-19 NOTE — TELEPHONE ENCOUNTER
Patient states she came here earlier in the week and saw [place] she explained she did get her lab results but she's still not feeling well. She wanted to some advise on what to take to feel better. Informed her the nurse will contact her back once she is available.

## 2018-01-19 NOTE — TELEPHONE ENCOUNTER
Call made to pt and encouraged pt to come in to be re-evaluated.  An appointment made for 1/23 at 1:30 with Kim GARCÍA.

## 2018-01-24 ENCOUNTER — OFFICE VISIT (OUTPATIENT)
Dept: FAMILY MEDICINE CLINIC | Age: 83
End: 2018-01-24

## 2018-01-24 ENCOUNTER — HOSPITAL ENCOUNTER (OUTPATIENT)
Dept: LAB | Age: 83
Discharge: HOME | End: 2018-01-24
Payer: MEDICARE

## 2018-01-24 VITALS
OXYGEN SATURATION: 95 % | TEMPERATURE: 97.3 F | SYSTOLIC BLOOD PRESSURE: 84 MMHG | DIASTOLIC BLOOD PRESSURE: 50 MMHG | WEIGHT: 145 LBS | HEART RATE: 88 BPM | HEIGHT: 67 IN | RESPIRATION RATE: 20 BRPM | BODY MASS INDEX: 22.76 KG/M2

## 2018-01-24 DIAGNOSIS — Z71.89 ACP (ADVANCE CARE PLANNING): ICD-10-CM

## 2018-01-24 DIAGNOSIS — Z13.39 SCREENING FOR ALCOHOLISM: ICD-10-CM

## 2018-01-24 DIAGNOSIS — R53.83 FATIGUE, UNSPECIFIED TYPE: ICD-10-CM

## 2018-01-24 DIAGNOSIS — R53.1 WEAKNESS: ICD-10-CM

## 2018-01-24 DIAGNOSIS — D46.9 MYELODYSPLASTIC SYNDROME (HCC): ICD-10-CM

## 2018-01-24 DIAGNOSIS — I51.7 LVH (LEFT VENTRICULAR HYPERTROPHY): ICD-10-CM

## 2018-01-24 DIAGNOSIS — E55.9 VITAMIN D DEFICIENCY: ICD-10-CM

## 2018-01-24 DIAGNOSIS — Z13.31 SCREENING FOR DEPRESSION: ICD-10-CM

## 2018-01-24 DIAGNOSIS — N18.9 ANEMIA ASSOCIATED WITH CHRONIC RENAL FAILURE: ICD-10-CM

## 2018-01-24 DIAGNOSIS — H91.93 BILATERAL HEARING LOSS, UNSPECIFIED HEARING LOSS TYPE: ICD-10-CM

## 2018-01-24 DIAGNOSIS — Z23 NEED FOR PNEUMOCOCCAL VACCINE: ICD-10-CM

## 2018-01-24 DIAGNOSIS — N18.30 CKD (CHRONIC KIDNEY DISEASE) STAGE 3, GFR 30-59 ML/MIN (HCC): ICD-10-CM

## 2018-01-24 DIAGNOSIS — Z00.00 ENCOUNTER FOR MEDICARE ANNUAL WELLNESS EXAM: ICD-10-CM

## 2018-01-24 DIAGNOSIS — D63.1 ANEMIA ASSOCIATED WITH CHRONIC RENAL FAILURE: ICD-10-CM

## 2018-01-24 DIAGNOSIS — I10 ESSENTIAL HYPERTENSION: Primary | ICD-10-CM

## 2018-01-24 DIAGNOSIS — Z13.820 ENCOUNTER FOR SCREENING FOR OSTEOPOROSIS: ICD-10-CM

## 2018-01-24 DIAGNOSIS — I95.9 HYPOTENSION, UNSPECIFIED HYPOTENSION TYPE: ICD-10-CM

## 2018-01-24 DIAGNOSIS — Z01.89 ENCOUNTER FOR LABORATORY EXAMINATION: ICD-10-CM

## 2018-01-24 LAB
ALBUMIN SERPL-MCNC: 3.4 G/DL (ref 3.4–5)
ALBUMIN/GLOB SERPL: 1 {RATIO} (ref 0.8–1.7)
ALP SERPL-CCNC: 53 U/L (ref 45–117)
ALT SERPL-CCNC: 13 U/L (ref 13–56)
ANION GAP SERPL CALC-SCNC: 7 MMOL/L (ref 3–18)
AST SERPL-CCNC: 22 U/L (ref 15–37)
BILIRUB SERPL-MCNC: 0.6 MG/DL (ref 0.2–1)
BUN SERPL-MCNC: 30 MG/DL (ref 7–18)
BUN/CREAT SERPL: 16 (ref 12–20)
CALCIUM SERPL-MCNC: 8.5 MG/DL (ref 8.5–10.1)
CHLORIDE SERPL-SCNC: 107 MMOL/L (ref 100–108)
CO2 SERPL-SCNC: 27 MMOL/L (ref 21–32)
CREAT SERPL-MCNC: 1.86 MG/DL (ref 0.6–1.3)
ERYTHROCYTE [DISTWIDTH] IN BLOOD BY AUTOMATED COUNT: 13 % (ref 11.6–14.5)
GLOBULIN SER CALC-MCNC: 3.5 G/DL (ref 2–4)
GLUCOSE SERPL-MCNC: 96 MG/DL (ref 74–99)
HCT VFR BLD AUTO: 30.9 % (ref 35–45)
HGB BLD-MCNC: 9.8 G/DL (ref 12–16)
MCH RBC QN AUTO: 32.8 PG (ref 24–34)
MCHC RBC AUTO-ENTMCNC: 31.7 G/DL (ref 31–37)
MCV RBC AUTO: 103.3 FL (ref 74–97)
PLATELET # BLD AUTO: 197 K/UL (ref 135–420)
PMV BLD AUTO: 9.9 FL (ref 9.2–11.8)
POTASSIUM SERPL-SCNC: 4.6 MMOL/L (ref 3.5–5.5)
PROT SERPL-MCNC: 6.9 G/DL (ref 6.4–8.2)
RBC # BLD AUTO: 2.99 M/UL (ref 4.2–5.3)
SODIUM SERPL-SCNC: 141 MMOL/L (ref 136–145)
TSH SERPL DL<=0.05 MIU/L-ACNC: 2.36 UIU/ML (ref 0.36–3.74)
WBC # BLD AUTO: 4.9 K/UL (ref 4.6–13.2)

## 2018-01-24 PROCEDURE — 85027 COMPLETE CBC AUTOMATED: CPT | Performed by: NURSE PRACTITIONER

## 2018-01-24 PROCEDURE — 82652 VIT D 1 25-DIHYDROXY: CPT | Performed by: NURSE PRACTITIONER

## 2018-01-24 PROCEDURE — 80053 COMPREHEN METABOLIC PANEL: CPT | Performed by: NURSE PRACTITIONER

## 2018-01-24 PROCEDURE — 84443 ASSAY THYROID STIM HORMONE: CPT | Performed by: NURSE PRACTITIONER

## 2018-01-24 NOTE — PATIENT INSTRUCTIONS
Preventive Services Limitations Recommendation Preventative Services Limitations Recommendation   Glaucoma Screening (no USPSTF recommendation) Diabetes mellitus, family history, , age 48 or over,  American, age 72 or over [de-identified] with eye doctor Periodontal Disease- Dentistry Services *May not be covered under Medicare Follows with dentist   Skin Cancer Screening Exam every year  *May not be covered under Medicare   Self checks encouraged Hearing Impairment Pure Tone Test every 3 years  *May not be covered under Medicare Declines ENT evaluation for hearing aids at this time   COPD and Lung Cancer Screening CT chest or chest xray yearly as deemed necessary  *May not be covered under Medicare Smoked in college- quit over 30 years ago    Not indicated for screening; no symptoms Counseling to Prevent Tobacco Use  - Counseling greater than 3 and up to 10 minutes  - Counseling greater than 10 minutes Patients must be asymptomatic of tobacco-related conditions to receive as preventive service    Up to 8 sessions/year Not indicated   Alcohol Misuse Counseling 4 brief face to face counseling sessions/year Not indicated Obesity Screening and Counseling BMI of 30 or more. Weekly visits for first month, then biweekly for months 2-6, then every month for months 7-12 if you lose 6.6 lbs in months 1-6 Body mass index is 22.71 kg/(m^2). Screening for STIs and Counseling Annually screenings- 2 face to face counseling sessions/year Will get Dr Ariel Ortez records Human Immunodeficiency Virus (HIV) Screening (annually for increased risk patients)  HIV-1 and HIV-2 by EIA, ALIRIO, rapid antibody test, or oral mucosa transudate Tests covered annually for patients at increased risk. Pregnant patients may receive up to 3 test during pregnancy.  Will get Dr Ariel Ortez records   Diabetes Screening Tests (at least every 3 years, Medicare covers annually or at 6-month intervals for prediabetic patients)    Fasting blood sugar (FBS) or glucose tolerance test (GTT) Diagnosed with one of the following:  -Hypertension, Dyslipidemia, obesity, previous impaired FBS or GTT  Or any two of the following: overweight, FH of diabetes, age ? 72, history of gestational diabetes, birth of baby weighing more than 9 pounds Will get Dr Christopher Huff records Cardiovascular Screening Blood Tests (every 5 years)  Total cholesterol, HDL, Triglycerides Order as a panel if possible Will get Dr Christopher Huff records   Medical Nutrition Therapy (MNT) (for diabetes or renal disease not recommended schedule) Requires referral by treating physician for patient with diabetes or renal disease. Up to 3 hours for initial year and 2 hours in subsequent years. Diagnosed with CKD- declines nutrition therapy Diabetes Self-Management Training (DSMT) (no USPSTF recommendation) Requires referral by treating physician for patient with diabetes or renal disease. 10 hours of initial DSMT session of no less than 30 minutes each in a continuous 12-month period. 2 hours of follow-up DSMT in subsequent years. Not indicated   Behavioral Therapy for Cardiovascular Disease Annually Not indicated Ultrasound Screening for Abdominal Aortic Aneurysm (AAA) (once) Patient must be referred through IPPE. Criteria:  - Men who are 73-68 years old and smoked >100 cigarettes.   -Anyone with FH of AAA  -Or recommended for screening by USPSTF Not indicated- no family history of abdominal aneurysm   Prostate Cancer Screening (annually up to age 76)  - Digital rectal exam (EZIO)  - Prostate specific antigen (PSA) Annually (age 48 or over), EZIO not paid separately when covered E/M service is provided on same date N/A Colorectal Cancer Screening -Fecal occult blood test (annual)  -Flexible sigmoidoscopy (5y)  -Screening colonoscopy (10y)  -Barium Enema Not indicated for further screening due to age   Bone Mass Measurement  (age 72 & older, biennial) Requires diagnosis related to osteoporosis or estrogen deficiency. History of long-term glucocorticoid tx or baseline is needed. Will get Dr Tsering Gonzalez records Screening Mammography (biennial age 54-69) Annually (age 36 or over) Not indicated for further screening due to age   Screening Pap Tests and Pelvic Examination (up to age 79 and after 79 if unknown history or abnormal study last 8 years) Every 25 months except high risk History of hysterectomy Hepatitis B Vaccinations (if medium/high risk) Medium/high risk factors:  End-stage renal disease,  Hemophiliacs who received Factor VIII or IX concentrates, Clients of institutions for the mentally retarded, Persons who live in the same house as a HepB virus carrier, Homosexual men, Illicit injectable drug abusers. Not high risk- not indicated for vaccine series   Seasonal Influenza Vaccination (annually)  Declines Pneumococcal Vaccination (once after 65)  Reports that she had in the past- will get past PCP Dr Tsering Gonzalez records   Herpes Zoster (Shingles) Vaccination (once after age 61) Administer to persons at age 48 years in specific conditions  *May not be covered by Medicare Declines Tetanus Vaccine Td booster every 10 years- Tdap if exposed to children under 1 year)  *May not be covered by Medicare   Unsure of last booster    No medical indication to give today         *Please check your BP at home for the next few days and write down. I will be calling later this week to discuss your labs and see how your BP has been doing at home.

## 2018-01-24 NOTE — MR AVS SNAPSHOT
[facility] 107 [address] [facility] 
[phone] Patient: Marva Resendiz MRN: [id number] WUP:[id number]/9/7413 Visit Information Date & Time Provider Department Dept. Phone Encounter #  
 1/24/2018  1:30 PM Jaylene Ying [phone] [id number] Follow-up Instructions Return in about 1 month (around 2/24/2018) for chronic disease routine care- 30 min. , Yearly Medicare Wellness-  done today. Eligio Gains Your Appointments 5/30/2018 11:30 AM  
Office Visit with Gopi Resendiz MD  
[facility]  [facility] [place] [facility] Appt Note: 6 MONTHS; [address] [facility], [place] [address] let[address]s [address], [address] [place] [address] Upcoming Health Maintenance Date Due DTaP/Tdap/Td series (1 - Tdap) 4/3/1949 ZOSTER VACCINE AGE 60> 2/3/1988 GLAUCOMA SCREENING Q2Y 4/3/1993 OSTEOPOROSIS SCREENING (DEXA) 4/3/1993 Pneumococcal 65+ High/Highest Risk (1 of 2 - PCV13) 4/3/1993 MEDICARE YEARLY EXAM 4/3/1993 Influenza Age 5 to Adult 8/1/2017 Allergies as of 1/24/2018  Review Complete On: 1/24/2018 By: Pina Carson LPN Severity Noted Reaction Type Reactions Codeine High 09/24/2012   Systemic Drowsiness Current Immunizations  Never Reviewed No immunizations on file. Not reviewed this visit You Were Diagnosed With   
  
 Codes Comments Fatigue, unspecified type    -  Primary ICD-10-CM: R53.83 ICD-9-CM: 780.79 Weakness     ICD-10-CM: R53.1 ICD-9-CM: 780.79 Hypotension, unspecified hypotension type     ICD-10-CM: I95.9 ICD-9-CM: 458.9 Encounter for laboratory examination     ICD-10-CM: Z01.89 ICD-9-CM: V72.60 Vitamin D deficiency     ICD-10-CM: E55.9 ICD-9-CM: 268.9 Encounter for Medicare annual wellness exam     ICD-10-CM: Z00.00 ICD-9-CM: V70.0 Screening for depression     ICD-10-CM: Z13.89 ICD-9-CM: V79.0 Screening for alcoholism     ICD-10-CM: Z13.89 ICD-9-CM: V79.1 ACP (advance care planning)     ICD-10-CM: Z71.89 ICD-9-CM: V65.49 Vitals BP Pulse Temp Resp Height(growth percentile) Weight(growth percentile) (!) 84/50 (BP 1 Location: Left arm, BP Patient Position: Sitting) 88 97.3 °F (36.3 °C) (Oral) 20 5' 7\" (1.702 m) 145 lb (65.8 kg) SpO2 BMI OB Status Smoking Status 95% 22.71 kg/m2 Hysterectomy Never Smoker Vitals History BMI and BSA Data Body Mass Index Body Surface Area  
 22.71 kg/m 2 1.76 m 2 Preferred Pharmacy Pharmacy Name Phone [facility], [facility] [phone] Your Updated Medication List  
  
   
This list is accurate as of: 1/24/18  2:08 PM.  Always use your most recent med list.  
  
  
  
  
 ASPIRIN LOW-STRENGTH 81 mg chewable tablet Generic drug:  aspirin Take 81 mg by mouth daily. diazePAM 2 mg tablet Commonly known as:  VALIUM Take 2 mg by mouth as needed. DIOVAN -25 mg per tablet Generic drug:  valsartan-hydroCHLOROthiazide Take 1 Tab by mouth daily. Indications: HYPERTENSION  
  
 ergocalciferol 50,000 unit capsule Commonly known as:  ERGOCALCIFEROL Take 1 Cap by mouth every seven (7) days. hydroCHLOROthiazide 12.5 mg tablet Commonly known as:  HYDRODIURIL Take 12.5 mg by mouth daily. PRN leg swelling.  
  
 multivitamin tablet Commonly known as:  ONE A DAY Take 1 Tab by mouth daily. Indications: VITAMIN DEFICIENCY PREVENTION  
  
 nitrofurantoin 100 mg capsule Commonly known as:  MACRODANTIN Take 1 Cap by mouth two (2) times daily (with meals). We Performed the Following COLLECTION VENOUS BLOOD,VENIPUNCTURE [id number] CPT(R)] Follow-up Instructions Return in about 1 month (around 2/24/2018) for chronic disease routine care- 30 min. , Yearly Medicare Wellness-  done today. Bia Pozo To-Do List   
 Around 01/25/2018 Lab:  CBC W/O DIFF Around 01/25/2018 Lab:  METABOLIC PANEL, COMPREHENSIVE Around 01/25/2018 Lab:  TSH 3RD GENERATION   
  
 01/25/2018 Lab:  VITAMIN D, 1, 25 DIHYDROXY Patient Instructions Preventive Services Limitations Recommendation Preventative Services Limitations Recommendation Glaucoma Screening (no USPSTF recommendation) Diabetes mellitus, family history, , age 48 or over,  American, age 72 or over [de-identified] with eye doctor Periodontal Disease- Dentistry Services *May not be covered under Medicare Follows with dentist  
Skin Cancer Screening Exam every year *May not be covered under Medicare Self checks encouraged Hearing Impairment Pure Tone Test every 3 years *May not be covered under Medicare Declines ENT evaluation for hearing aids at this time COPD and Lung Cancer Screening CT chest or chest xray yearly as deemed necessary *May not be covered under Medicare Smoked in college- quit over 30 years ago Not indicated for screening; no symptoms Counseling to Prevent Tobacco Use 
- Counseling greater than 3 and up to 10 minutes - Counseling greater than 10 minutes Patients must be asymptomatic of tobacco-related conditions to receive as preventive service Up to 8 sessions/year Not indicated Alcohol Misuse Counseling 4 brief face to face counseling sessions/year Not indicated Obesity Screening and Counseling BMI of 30 or more. Weekly visits for first month, then biweekly for months 2-6, then every month for months 7-12 if you lose 6.6 lbs in months 1-6 Body mass index is 22.71 kg/(m^2). Screening for STIs and Counseling Annually screenings- 2 face to face counseling sessions/year Will get Dr Luke Aggarwal records Human Immunodeficiency Virus (HIV) Screening (annually for increased risk patients) HIV-1 and HIV-2 by EIA, ALIRIO, rapid antibody test, or oral mucosa transudate Tests covered annually for patients at increased risk. Pregnant patients may receive up to 3 test during pregnancy. Will get Dr Konrad Sue records Diabetes Screening Tests (at least every 3 years, Medicare covers annually or at 6-month intervals for prediabetic patients) Fasting blood sugar (FBS) or glucose tolerance test (GTT) Diagnosed with one of the following: 
-Hypertension, Dyslipidemia, obesity, previous impaired FBS or GTT 
Or any two of the following: overweight, FH of diabetes, age ? 72, history of gestational diabetes, birth of baby weighing more than 9 pounds Will get Dr Konrad Sue records Cardiovascular Screening Blood Tests (every 5 years) Total cholesterol, HDL, Triglycerides Order as a panel if possible Will get Dr Konrad Sue records Medical Nutrition Therapy (MNT) (for diabetes or renal disease not recommended schedule) Requires referral by treating physician for patient with diabetes or renal disease. Up to 3 hours for initial year and 2 hours in subsequent years. Not indicated Diabetes Self-Management Training (DSMT) (no USPSTF recommendation) Requires referral by treating physician for patient with diabetes or renal disease. 10 hours of initial DSMT session of no less than 30 minutes each in a continuous 12-month period. 2 hours of follow-up DSMT in subsequent years. Not indicated Behavioral Therapy for Cardiovascular Disease Annually Not indicated Ultrasound Screening for Abdominal Aortic Aneurysm (AAA) (once) Patient must be referred through IPPE. Criteria: 
- Men who are 73-68 years old and smoked >100 cigarettes. -Anyone with FH of AAA 
-Or recommended for screening by USPSTF Not indicated- no family history of abdominal aneurysm Prostate Cancer Screening (annually up to age 76) - Digital rectal exam (EZIO) - Prostate specific antigen (PSA) Annually (age 48 or over), EZIO not paid separately when covered E/M service is provided on same date N/A Colorectal Cancer Screening -Fecal occult blood test (annual) -Flexible sigmoidoscopy (5y) 
-Screening colonoscopy (10y) -Barium Enema Not indicated for further screening due to age Bone Mass Measurement 
(age 72 & older, biennial) Requires diagnosis related to osteoporosis or estrogen deficiency. History of long-term glucocorticoid tx or baseline is needed. Will get Dr Jessica Perez records Screening Mammography (biennial age 54-69) Annually (age 36 or over) Not indicated for further screening due to age Screening Pap Tests and Pelvic Examination (up to age 79 and after 79 if unknown history or abnormal study last 10 years) Every 24 months except high risk History of hysterectomy Hepatitis B Vaccinations (if medium/high risk) Medium/high risk factors:  End-stage renal disease, Hemophiliacs who received Factor VIII or IX concentrates, Clients of institutions for the mentally retarded, Persons who live in the same house as a HepB virus carrier, Homosexual men, Illicit injectable drug abusers. Not high risk- not indicated for vaccine series Seasonal Influenza Vaccination (annually)  Declines Pneumococcal Vaccination (once after 65)  Reports that she had in the past- will get past PCP Dr Jessica Perez records Herpes Zoster (Shingles) Vaccination (once after age 61) [de-identified] to persons at age 48 years in specific conditions *May not be covered by Medicare Declines Tetanus Vaccine Td booster every 10 years- Tdap if exposed to children under 1 year) *May not be covered by [facility] Unsure of last booster No medical indication to give today *Please check your BP at home for the next few days and write down. I will be calling later this week to discuss your labs and see how your BP has been doing at home. Introducing [facility]! Matt Gramajo introduces [employer] patient portal. Now you can access parts of your medical record, email your doctor's office, and request medication refills online. 1. In your internet browser, go to [email or web address] 2. Click on the First Time User? Click Here link in the Sign In box. You will see the New Member Sign Up page. 3. Enter your [employer] Access Code exactly as it appears below. You will not need to use this code after youve completed the sign-up process. If you do not sign up before the expiration date, you must request a new code. · [employer] Access Code: [email or web address] Expires: 2/27/2018 10:55 AM 
 
4. Enter the last four digits of your Social Security Number (xxxx) and Date of Birth (mm/dd/yyyy) as indicated and click Submit. You will be taken to the next sign-up page. 5. Create a [employer] ID. This will be your [employer] login ID and cannot be changed, so think of one that is secure and easy to remember. 6. Create a [employer] password. You can change your password at any time. 7. Enter your Password Reset Question and Answer. This can be used at a later time if you forget your password. 8. Enter your e-mail address. You will receive e-mail notification when new information is available in [address] 9. Click Sign Up. You can now view and download portions of your medical record. 10. Click the Download Summary menu link to download a portable copy of your medical information. If you have questions, please visit the Frequently Asked Questions section of the [employer] website. Remember, [employer] is NOT to be used for urgent needs. For medical emergencies, dial 911. Now available from your iPhone and Android! Please provide this summary of care documentation to your next provider. Your primary care clinician is listed as Thor Vernon. If you have any questions after today's visit, please call [phone].

## 2018-01-24 NOTE — PROGRESS NOTES
OFFICE NOTE    Sophia Todd is a 80 y.o. female presenting today for office visit. 1/24/2018  1:53 PM    Chief Complaint   Patient presents with    Fatigue     pt here with c/o feeling fatigue and weak    Annual Wellness Visit     pt here for medicare wellness visit       HPI: Here today for establishment of care, medicare wellness (see other note), and complaints of fatigue. Used to follow with Dr Malia Dixon- no records available at this time. She is not sure of her last routine lab check. She follows with Dr Sujey Blum (Myelodyspastic syndrome) but also states that she may see other providers. No family here today with patient. HTN/LVH: Reports that she is taking medication as prescribed- unsure if she is on Diovan, HCTZ, or the combination therapy- no pill bottles brought today with patient. Does not check BP at home but does have cuff. Takes an ASA daily. Has seen cardiology in the past. Had ECHO and nuclear stress test done in 2012- mild LVH noted with EJF 74%, negative nuclear stress. CKD/Vit D: Diagnosed with in the past. Anemia associated with. Vit D deficiency noted intermittently- currently not on any Vit D supplement or MVI. Continues to complain of fatigue, generalized weakness, and decreased appetite that has been happening since around [place]. Reports that she was diagnosed with bronchitis after Christmas by her PCP and was placed on Doxycyline and Tessalon Perles- she reports that she completed the AB but still has not really be feeling better. States that some of her symptoms resolved but still has the above symptoms persisting. Evaluated by me 1/10 and was having cough and congestion at that time- these are now resolved. Just persisting with fatigue, weakness, and decreased appetite. Review of Systems   Constitutional: Positive for appetite change and fatigue. Negative for chills and fever.    HENT: Negative for congestion, ear pain, rhinorrhea, sinus pressure, sneezing and sore throat. Eyes: Negative for pain, discharge, itching and visual disturbance. Respiratory: Negative for cough, shortness of breath and wheezing. Cardiovascular: Negative for chest pain, palpitations and leg swelling. Gastrointestinal: Negative for abdominal pain, constipation, diarrhea, nausea and vomiting. Genitourinary: Negative for difficulty urinating and frequency. Musculoskeletal: Negative for arthralgias and myalgias. Skin: Negative for rash. Allergic/Immunologic: Negative for environmental allergies. Neurological: Positive for weakness. Negative for dizziness, speech difficulty, numbness and headaches. PHQ Screening   PHQ over the last two weeks 1/24/2018   Little interest or pleasure in doing things Not at all   Feeling down, depressed or hopeless Not at all   Total Score PHQ 2 0         History  Past Medical History:   Diagnosis Date    CKD (chronic kidney disease)     HTN (hypertension)     LVH (left ventricular hypertrophy) 2012    noted on ECHO, mild- EJF 74%    Vitamin D deficiency        Past Surgical History:   Procedure Laterality Date    HX HYSTERECTOMY      HX PARTIAL THYROIDECTOMY      goiter removed       Social History     Social History    Marital status:      Spouse name: N/A    Number of children: N/A    Years of education: N/A     Occupational History    Not on file.      Social History Main Topics    Smoking status: Never Smoker    Smokeless tobacco: Never Used    Alcohol use No    Drug use: No    Sexual activity: Not on file     Other Topics Concern    Not on file     Social History Narrative       Family History   Problem Relation Age of Onset    Diabetes Other     Hypertension Other      Positive family history for essential hypertension       Allergies   Allergen Reactions    Codeine Drowsiness       Current Outpatient Prescriptions   Medication Sig Dispense Refill    aspirin (ASPIRIN LOW-STRENGTH) 81 mg chewable tablet Take 81 mg by mouth daily.  Hydrochlorothiazide 12.5 mg tablet Take 12.5 mg by mouth daily. PRN leg swelling.  valsartan-hydrochlorothiazide (DIOVAN HCT) 160-25 mg per tablet Take 1 Tab by mouth daily. Indications: HYPERTENSION      multivitamin (ONE A DAY) tablet Take 1 Tab by mouth daily. Indications: VITAMIN DEFICIENCY PREVENTION      diazepam (VALIUM) 2 mg tablet Take 2 mg by mouth as needed. Health Maintenance and Screenings  *Medicare wellness noted today      Advance Care Planning:   Patient was offered the opportunity to discuss advance care planning YES   Does patient have an Advance Directive:  NO   If no, did you provide information on Caring Connections? YES       Patient Care Team:  Patient Care Team:  Salvatore Gonzalez NP as PCP - General (Nurse Practitioner)  Maria Eugenia Cartagena MD (Oncology)        LABS:       Ref.  Range 1/10/2018 15:30   QuickVue Influenza test Latest Ref Range: Negative  Negative       1/12/2018 10:45 AM - Cory, Lab In [facility]       Component Results      Component Value Ref Range & Units Status     Special Requests: NO SPECIAL REQUESTS   Final     Urine Culture result: NO GROWTH 2 DAYS   Final           Comprehensive Metabolic Panel (02/60/2157 2:26 PM)  Comprehensive Metabolic Panel (43/38/1133 2:26 PM)   Component Value Ref Range   Potassium 4.2 3.5 - 5.5 mmol/L   SODIUM 144 133 - 145 mmol/L   CHLORIDE 104 98 - 110 mmol/L   Glucose 101 (H) 65 - 99 mg/dL   CALCIUM 8.9 8.4 - 10.5 mg/dL   ALBUMIN 4.3 3.5 - 5.0 g/dL   SGPT (ALT) 8 5 - 40 U/L   SGOT (AST) 21 10 - 37 U/L   BILIRUBIN TOTAL 0.9 0.2 - 1.2 mg/dL   ALKALINE PHOSPHATASE 51 40 - 120 U/L   BUN 33 (H) 6 - 22 mg/dL   CO2 27 20 - 32 mmol/L   CREATININE 1.5 (H) 0.8 - 1.4 mg/dL   eGFR African American 38.8 (L) >60.0   eGFR Non  32.0 (L) >60.0   GLOBULIN SERUM 2.6 2.0 - 4.0 g/dL   A/G RATIO 1.7 1.1 - 2.6 ratio   TOTAL PROTEIN 6.9 6.2 - 8.1 g/dL   ANION GAP 13.1 mmol/L     Vitamin D 25 Hydroxy (02/23/2017 2:26 PM)  Vitamin D 25 Hydroxy (02/23/2017 2:26 PM)   Component Value Ref Range   Vitamin D, 25 Hydroxy 32.8 32.0 - 100.0 ng/mL       TSH (02/23/2017 2:26 PM)  TSH (02/23/2017 2:26 PM)   Component Value Ref Range   TSH 1.69 0.27 - 4.20 mcU/mL       Lipid Complete Panel (02/23/2017 2:26 PM)  Lipid Complete Panel (02/23/2017 2:26 PM)   Component Value Ref Range   Cholesterol 196 110 - 200 mg/dL   Triglyceride 86 40 - 149 mg/dL   HDL 63 (H) 40 - 59 mg/dL   LDL CALCULATION 116 (H) 50 - 99 mg/dL   VLDL CALCULATION 17 8 - 30 mg/dL         RADIOLOGY:    XR Results (most recent):    Results from Appointment encounter on 01/10/18   XR CHEST PA LAT   Narrative EXAMINATION: Chest 2 views    INDICATION: Persistent cough    COMPARISON: 9/19/2012    FINDINGS: Frontal and lateral views of the chest obtained. No consolidation. Mediastinal silhouette and pulmonary vasculature unremarkable. No evidence of  pneumothorax. No acute osseous findings. Impression IMPRESSION:    1. No acute findings. Physical Exam   Constitutional: She is oriented to person, place, and time. She appears well-developed and well-nourished. No distress. HENT:   Right Ear: Tympanic membrane, external ear and ear canal normal.   Left Ear: Tympanic membrane, external ear and ear canal normal.   Nose: Nose normal. No mucosal edema or rhinorrhea. Right sinus exhibits no maxillary sinus tenderness and no frontal sinus tenderness. Left sinus exhibits no maxillary sinus tenderness and no frontal sinus tenderness. Mouth/Throat: Uvula is midline, oropharynx is clear and moist and mucous membranes are normal. No oropharyngeal exudate or posterior oropharyngeal erythema. Eyes: EOM are normal. Pupils are equal, round, and reactive to light. Right eye exhibits no discharge. Left eye exhibits no discharge. Neck: Normal range of motion. Neck supple. No thyromegaly present.    Cardiovascular: Normal rate, regular rhythm and normal heart sounds. No murmur heard. Pulmonary/Chest: Effort normal and breath sounds normal. No respiratory distress. Abdominal: Soft. Bowel sounds are normal. There is no tenderness. Musculoskeletal: She exhibits no edema. Lymphadenopathy:     She has no cervical adenopathy. Neurological: She is alert and oriented to person, place, and time. She exhibits normal muscle tone. Coordination and gait normal.   -Sitting in wheelchair, ambulatory with cane during office visit to use restroom   Skin: Skin is warm and dry. No rash noted. Vitals:    01/24/18 1329   BP: (!) 84/50   Pulse: 88   Resp: 20   Temp: 97.3 °F (36.3 °C)   TempSrc: Oral   SpO2: 95%   Weight: 145 lb (65.8 kg)   Height: 5' 7\" (1.702 m)   PainSc:   0 - No pain     Wt Readings from Last 3 Encounters:   01/24/18 145 lb (65.8 kg)   01/10/18 140 lb (63.5 kg)   11/29/17 154 lb 9.6 oz (70.1 kg)         Assessment and Plan    Essential hypertension  *Patient unsure what exact pharmacological therapy she is on. See below, low BP at this time. LVH (left ventricular hypertrophy)  *Noted. Will address PRN. CKD (chronic kidney disease) stage 3, GFR 30-59 ml/min  *Noted. Will avoid NSAIDs and other agents that may further damage kidneys. Anemia associated with chronic renal failure  *Noted. Follows with hematology. Myelodysplastic syndrome ([facility] [address]  *Follows with Dr Nohelia Hill. Address PRN. Vitamin D deficiency  *Check at this time. Reports not being on any supplement. - VITAMIN D, 1, 25 DIHYDROXY; Future    Fatigue, unspecified type/ Weakness  *Discussed with patient. She has slowly been improving. No weight loss is noted. Low BP is noted at this time, see below. Will check labs. Had negative urine culture, cxray, and flu testing done last visit. - CBC W/O DIFF; Future  - METABOLIC PANEL, COMPREHENSIVE; Future  - TSH 3RD GENERATION; Future  - VITAMIN D, 1, 25 DIHYDROXY; Future    Hypotension, unspecified hypotension type  *Noted today.  She denies any dizziness or symptoms related to other than fatigue and weakness. Advised to check BP at home and I will call her Friday to get readings and discuss her lab results. Advised to rise slowly. Encounter for laboratory examination  - COLLECTION VENOUS BLOOD,VENIPUNCTURE          *Plan of care reviewed with patient. Patient in agreement with plan and expresses understanding. All questions answered and patient encouraged to call or RTO if further questions or concerns. Follow-up Disposition:  Return in about 1 month (around 2/24/2018) for chronic disease routine care- 30 min. , Yearly Medicare Wellness-  done today. Lexa Sams

## 2018-01-25 LAB — 1,25(OH)2D3 SERPL-MCNC: 32.4 PG/ML (ref 19.9–79.3)

## 2018-01-26 ENCOUNTER — TELEPHONE (OUTPATIENT)
Dept: FAMILY MEDICINE CLINIC | Age: 83
End: 2018-01-26

## 2018-01-26 NOTE — TELEPHONE ENCOUNTER
1/26/2018  11:51 AM    Chief Complaint   Patient presents with    Visit Follow-up Call    Results       Called patient at this time related to labs and to see how she is feeling. She reported fatigue and weakness that has not went away. She had reported cold symptoms but those had resolved. Spoke with patient on the phone related to labs- notified of slightly lower H&H and slightly higher Creatine level. She was recommended to check her BP at home since it was slightly low in the office. BP's reported being much better. She reports that it has returned back to normal. She does not have any readings to report to me though. Advised to continue with Dr Isabel Ramsay. Advised to get a MVI, keep hydrated, and rest.     Today, she reports that she has been gradually feeling better. Has had more of an appetite, sleeping better, and having more energy. No new complaints. Thanked me for the call. *Plan of care reviewed with patient. Patient in agreement with plan and expresses understanding. All questions answered and patient encouraged to call or RTO if further questions or concerns.            Results for orders placed or performed during the hospital encounter of 01/24/18   CBC W/O DIFF   Result Value Ref Range    WBC 4.9 4.6 - 13.2 K/uL    RBC 2.99 (L) 4.20 - 5.30 M/uL    HGB 9.8 (L) 12.0 - 16.0 g/dL    HCT 30.9 (L) 35.0 - 45.0 %    .3 (H) 74.0 - 97.0 FL    MCH 32.8 24.0 - 34.0 PG    MCHC 31.7 31.0 - 37.0 g/dL    RDW 13.0 11.6 - 14.5 %    PLATELET 475 429 - 809 K/uL    MPV 9.9 9.2 - 28.5 FL   METABOLIC PANEL, COMPREHENSIVE   Result Value Ref Range    Sodium 141 136 - 145 mmol/L    Potassium 4.6 3.5 - 5.5 mmol/L    Chloride 107 100 - 108 mmol/L    CO2 27 21 - 32 mmol/L    Anion gap 7 3.0 - 18 mmol/L    Glucose 96 74 - 99 mg/dL    BUN 30 (H) 7.0 - 18 MG/DL    Creatinine 1.86 (H) 0.6 - 1.3 MG/DL    BUN/Creatinine ratio 16 12 - 20      GFR est AA 31 (L) >60 ml/min/1.73m2    GFR est non-AA 26 (L) >60 ml/min/1.73m2 Calcium 8.5 8.5 - 10.1 MG/DL    Bilirubin, total 0.6 0.2 - 1.0 MG/DL    ALT (SGPT) 13 13 - 56 U/L    AST (SGOT) 22 15 - 37 U/L    Alk.  phosphatase 53 45 - 117 U/L    Protein, total 6.9 6.4 - 8.2 g/dL    Albumin 3.4 3.4 - 5.0 g/dL    Globulin 3.5 2.0 - 4.0 g/dL    A-G Ratio 1.0 0.8 - 1.7     TSH 3RD GENERATION   Result Value Ref Range    TSH 2.36 0.36 - 3.74 uIU/mL   VITAMIN D, 1, 25 DIHYDROXY   Result Value Ref Range    Calcitriol (Vit D 1, 25 di-OH) 32.4 19.9 - 79.3 pg/mL

## 2018-01-27 PROBLEM — N18.9 CKD (CHRONIC KIDNEY DISEASE): Status: ACTIVE | Noted: 2018-01-27

## 2018-01-27 PROBLEM — N18.30 ANEMIA OF CHRONIC RENAL FAILURE, STAGE 3 (MODERATE) (HCC): Status: RESOLVED | Noted: 2017-06-12 | Resolved: 2018-01-27

## 2018-01-27 PROBLEM — D63.1 ANEMIA OF CHRONIC RENAL FAILURE, STAGE 3 (MODERATE) (HCC): Status: RESOLVED | Noted: 2017-06-12 | Resolved: 2018-01-27

## 2018-01-27 NOTE — ACP (ADVANCE CARE PLANNING)
Advance Care Planning (ACP) Provider Note - Comprehensive     Date of ACP Conversation: 01/24/18  Persons included in Conversation:  patient  Length of ACP Conversation in minutes:  <16 minutes (Non-Billable)    Authorized Decision Maker (if patient is incapable of making informed decisions): This person is: Other Legally Authorized Decision Maker (e.g. Next of Kin)          General ACP for ALL Patients with Decision Making Capacity:   Importance of advance care planning, including choosing a healthcare agent to communicate patient's healthcare decisions if patient lost the ability to make decisions, such as after a sudden illness or accident    Review of Existing Advance Directive:  N/A    For Serious or Chronic Illness:  Understanding of medical condition    Understanding of CPR, goals and expected outcomes, benefits and burdens discussed.     Interventions Provided:  Recommended completion of Advance Directive form after review of ACP materials and conversation with prospective healthcare agent

## 2018-01-27 NOTE — PROGRESS NOTES
1/24/2018  2:10 PM    Chief Complaint   Patient presents with    Fatigue     pt here with c/o feeling fatigue and weak    Annual Wellness Visit     pt here for medicare wellness visit       This is an Initial Medicare Annual Wellness Exam (AWV) (Performed 12 months after IPPE or effective date of Medicare Part B enrollment, Once in a lifetime)    I have reviewed the patient's medical history in detail and updated the computerized patient record. History     Past Medical History:   Diagnosis Date    CKD (chronic kidney disease)     HTN (hypertension)     LVH (left ventricular hypertrophy) 2012    noted on ECHO, mild- EJF 74%    Vitamin D deficiency       Past Surgical History:   Procedure Laterality Date    HX HYSTERECTOMY      HX PARTIAL THYROIDECTOMY      goiter removed     Current Outpatient Prescriptions   Medication Sig Dispense Refill    aspirin (ASPIRIN LOW-STRENGTH) 81 mg chewable tablet Take 81 mg by mouth daily.  Hydrochlorothiazide 12.5 mg tablet Take 12.5 mg by mouth daily. PRN leg swelling.  valsartan-hydrochlorothiazide (DIOVAN HCT) 160-25 mg per tablet Take 1 Tab by mouth daily. Indications: HYPERTENSION      multivitamin (ONE A DAY) tablet Take 1 Tab by mouth daily. Indications: VITAMIN DEFICIENCY PREVENTION      diazepam (VALIUM) 2 mg tablet Take 2 mg by mouth as needed.          Allergies   Allergen Reactions    Codeine Drowsiness     Family History   Problem Relation Age of Onset    Diabetes Other     Hypertension Other      Positive family history for essential hypertension     Social History   Substance Use Topics    Smoking status: Never Smoker    Smokeless tobacco: Never Used    Alcohol use No     Patient Active Problem List   Diagnosis Code    Vitamin D deficiency E55.9    Anemia associated with chronic renal failure D63.1    Myelodysplastic syndrome (HCC) D46.9    Chronic leukopenia D72.819    HTN (hypertension) I10    LVH (left ventricular hypertrophy) I51.7    CKD (chronic kidney disease) N18.9         Depression Risk Factor Screening:     PHQ over the last two weeks 1/24/2018   Little interest or pleasure in doing things Not at all   Feeling down, depressed or hopeless Not at all   Total Score PHQ 2 0       Alcohol Risk Factor Screening: You do not drink alcohol or very rarely. Functional Ability and Level of Safety:     Hearing Loss   Mild [place]    Activities of Daily Living   Self-care. Requires assistance with: no ADLs    Fall Risk     Fall Risk Assessment, last 12 mths 1/24/2018   Able to walk? Yes   Fall in past 12 months?  No       Abuse Screen   Patient is not abused  Denies financial, physical, or verbal abuse    Review of Systems   Constitutional: negative for fevers, chills and weight loss  Respiratory: negative for cough, wheezing or dyspnea on exertion  Cardiovascular: negative for chest pain, palpitations  Gastrointestinal: negative for abdominal pain, N/V/D  Musculoskeletal:negative for myalgias and muscle weakness    Labwork/Imaging     Comprehensive Metabolic Panel (20/44/8973 2:26 PM)  Comprehensive Metabolic Panel (61/63/8114 2:26 PM)   Component Value Ref Range   Potassium 4.2 3.5 - 5.5 mmol/L   SODIUM 144 133 - 145 mmol/L   CHLORIDE 104 98 - 110 mmol/L   Glucose 101 (H) 65 - 99 mg/dL   CALCIUM 8.9 8.4 - 10.5 mg/dL   ALBUMIN 4.3 3.5 - 5.0 g/dL   SGPT (ALT) 8 5 - 40 U/L   SGOT (AST) 21 10 - 37 U/L   BILIRUBIN TOTAL 0.9 0.2 - 1.2 mg/dL   ALKALINE PHOSPHATASE 51 40 - 120 U/L   BUN 33 (H) 6 - 22 mg/dL   CO2 27 20 - 32 mmol/L   CREATININE 1.5 (H) 0.8 - 1.4 mg/dL   eGFR African American 38.8 (L) >60.0   eGFR Non  32.0 (L) >60.0   GLOBULIN SERUM 2.6 2.0 - 4.0 g/dL   A/G RATIO 1.7 1.1 - 2.6 ratio   TOTAL PROTEIN 6.9 6.2 - 8.1 g/dL   ANION GAP 13.1 mmol/L       Lipid Complete Panel (02/23/2017 2:26 PM)  Lipid Complete Panel (02/23/2017 2:26 PM)   Component Value Ref Range   Cholesterol 196 110 - 200 mg/dL Triglyceride 86 40 - 149 mg/dL   HDL 63 (H) 40 - 59 mg/dL   LDL CALCULATION 116 (H) 50 - 99 mg/dL   VLDL CALCULATION 17 8 - 30 mg/dL         Physical Examination     Evaluation of Cognitive Function:  Mood/affect:  neutral  Appearance: age appropriate, casually dressed and within normal Limits  Family member/caregiver input: none present    Blood pressure (!) 84/50, pulse 88, temperature 97.3 °F (36.3 °C), temperature source Oral, resp. rate 20, height 5' 7\" (1.702 m), weight 145 lb (65.8 kg), SpO2 95 %. Body mass index is 22.71 kg/(m^2). General appearance: alert, cooperative, no distress  Lungs: clear to auscultation bilaterally  Heart: regular rate and rhythm, S1, S2 normal, no murmur, click, rub or gallop  Abdomen: soft, non-tender. Bowel sounds normal. No masses,  no organomegaly  Extremities: extremities normal, atraumatic, no cyanosis or edema    Patient Care Team:  Peyton Victor NP as PCP - General (Nurse Practitioner)  Sarah Garza MD (Oncology)    Advice/Referrals/Counseling   Education and counseling provided:  Are appropriate based on today's review and evaluation  End-of-Life planning (with patient's consent)  Pneumococcal Vaccine  Influenza Vaccine  Bone mass measurement (DEXA)  Medical nutrition therapy for individuals with diabetes or renal disease    Assessment/Plan   Diagnoses and all orders for this visit:      Encounter for Medicare annual wellness exam  *Medicare wellness completed. Education and counseling provided, recommendations made- see Medicare chart in patient instructions and see below.   -Screening for depression  -Screening for alcoholism    Essential hypertension  *Noted. On BP medication. BP low today (see problem note). CKD (chronic kidney disease) stage 3, GFR 30-59 ml/min  *Noted. Declines nutrition therapy. Bilateral hearing loss, unspecified hearing loss type  *Noted- she declines ENT evaluation for possible hearing aids.      Encounter for screening for osteoporosis  *Unsure of last DEXA. Will obtain previous PCP records. Need for pneumococcal vaccine  *Unsure of last vaccination. Will obtain previous PCP records. ACP (advance care planning)  *See ACP note. *Plan of care reviewed with patient. Patient in agreement with plan and expresses understanding. All questions answered and patient encouraged to call or RTO if further questions or concerns. Follow-up Disposition:  Return in about 1 month (around 2/24/2018) for chronic disease routine care- 30 min. , Yearly Medicare Wellness-  done today. ..

## 2018-02-20 ENCOUNTER — OFFICE VISIT (OUTPATIENT)
Dept: FAMILY MEDICINE CLINIC | Age: 83
End: 2018-02-20

## 2018-02-20 VITALS
RESPIRATION RATE: 18 BRPM | HEART RATE: 77 BPM | SYSTOLIC BLOOD PRESSURE: 112 MMHG | BODY MASS INDEX: 23.7 KG/M2 | HEIGHT: 67 IN | WEIGHT: 151 LBS | DIASTOLIC BLOOD PRESSURE: 56 MMHG | OXYGEN SATURATION: 98 % | TEMPERATURE: 97.3 F

## 2018-02-20 DIAGNOSIS — I51.7 LVH (LEFT VENTRICULAR HYPERTROPHY): ICD-10-CM

## 2018-02-20 DIAGNOSIS — N18.9 ANEMIA ASSOCIATED WITH CHRONIC RENAL FAILURE: ICD-10-CM

## 2018-02-20 DIAGNOSIS — N18.30 STAGE 3 CHRONIC KIDNEY DISEASE (HCC): ICD-10-CM

## 2018-02-20 DIAGNOSIS — D72.819 CHRONIC LEUKOPENIA: ICD-10-CM

## 2018-02-20 DIAGNOSIS — D63.1 ANEMIA ASSOCIATED WITH CHRONIC RENAL FAILURE: ICD-10-CM

## 2018-02-20 DIAGNOSIS — E55.9 VITAMIN D DEFICIENCY: ICD-10-CM

## 2018-02-20 DIAGNOSIS — I10 ESSENTIAL HYPERTENSION: Primary | ICD-10-CM

## 2018-02-20 DIAGNOSIS — D46.9 MYELODYSPLASTIC SYNDROME (HCC): ICD-10-CM

## 2018-02-20 DIAGNOSIS — R53.83 FATIGUE, UNSPECIFIED TYPE: ICD-10-CM

## 2018-02-20 RX ORDER — ERGOCALCIFEROL 1.25 MG/1
50000 CAPSULE ORAL
Qty: 4 CAP | Refills: 11 | Status: SHIPPED | OUTPATIENT
Start: 2018-02-20 | End: 2018-02-20 | Stop reason: SDUPTHER

## 2018-02-20 RX ORDER — VALSARTAN AND HYDROCHLOROTHIAZIDE 160; 25 MG/1; MG/1
1 TABLET ORAL DAILY
Qty: 30 TAB | Refills: 6 | Status: SHIPPED | OUTPATIENT
Start: 2018-02-20 | End: 2019-03-26

## 2018-02-20 RX ORDER — CYANOCOBALAMIN 1000 UG/ML
1000 INJECTION, SOLUTION INTRAMUSCULAR; SUBCUTANEOUS ONCE
Qty: 1 VIAL | Refills: 0
Start: 2018-02-20 | End: 2018-02-20

## 2018-02-20 RX ORDER — ERGOCALCIFEROL 1.25 MG/1
50000 CAPSULE ORAL
Qty: 4 CAP | Refills: 11 | Status: SHIPPED | OUTPATIENT
Start: 2018-02-20 | End: 2019-03-26 | Stop reason: SDUPTHER

## 2018-02-20 NOTE — PATIENT INSTRUCTIONS
Fatigue: Care Instructions  Your Care Instructions    Fatigue is a feeling of tiredness, exhaustion, or lack of energy. You may feel fatigue because of too much or not enough activity. It can also come from stress, lack of sleep, boredom, and poor diet. Many medical problems, such as viral infections, can cause fatigue. Emotional problems, especially depression, are often the cause of fatigue. Fatigue is most often a symptom of another problem. Treatment for fatigue depends on the cause. For example, if you have fatigue because you have a certain health problem, treating this problem also treats your fatigue. If depression or anxiety is the cause, treatment may help. Follow-up care is a key part of your treatment and safety. Be sure to make and go to all appointments, and call your doctor if you are having problems. It's also a good idea to know your test results and keep a list of the medicines you take. How can you care for yourself at home? · Get regular exercise. But don't overdo it. Go back and forth between rest and exercise. · Get plenty of rest.  · Eat a healthy diet. Do not skip meals, especially breakfast.  · Reduce your use of caffeine, tobacco, and alcohol. Caffeine is most often found in coffee, tea, cola drinks, and chocolate. · Limit medicines that can cause fatigue. This includes tranquilizers and cold and allergy medicines. When should you call for help? Watch closely for changes in your health, and be sure to contact your doctor if:  ? · You have new symptoms such as fever or a rash. ? · Your fatigue gets worse. ? · You have been feeling down, depressed, or hopeless. Or you may have lost interest in things that you usually enjoy. ? · You are not getting better as expected. Where can you learn more? Go to http://javier-marylu.info/. Enter Q457 in the search box to learn more about \"Fatigue: Care Instructions. \"  Current as of: March 20, 2017  Content Version: 11.4  © 3174-6219 [employer]. Care instructions adapted under license by [employer] (which disclaims liability or warranty for this information). If you have questions about a medical condition or this instruction, always ask your healthcare professional. [address] any warranty or liability for your use of this information.

## 2018-02-20 NOTE — PROGRESS NOTES
Chief Complaint   Patient presents with    Fatigue     no energy, no appetite      1. Have you been to the ER, urgent care clinic since your last visit? Hospitalized since your last visit? No    2. Have you seen or consulted any other health care providers outside of the [address] [place] [address] since your last visit? Include any pap smears or colon screening.  No

## 2018-02-20 NOTE — PROGRESS NOTES
OFFICE NOTE    Marvelyn Eisenmenger is a 80 y.o. female presenting today for office visit. 2/20/2018  1:36 PM    Chief Complaint   Patient presents with    Fatigue     no energy, no appetite        HPI: Here today for complaints of fatigue, also due for chronic disease management. Newer patient to practice- used to see Dr Lisa Nichols- past records received and reviewed. Last labs done 1/2018 and 11/2017. Follows with Dr Doretta Severs. HTN: Chronic in nature- taking Valsartan/HCTZ. Does check BP at home occasionally- reports that it has been running normal but she does not recall the exact numbers. Denies any headaches, dizziness, chest pain, SOB, or leg swelling. Has seen cardiology in the past- last ECHO 2012 with EJF 74% with mild LVH. Myelodysplastic Syndrome/Anemia/ Vit D def: Follows with Dr Doretta Severs- currently on Procrit. Was previously placed on Vit D 50,000 units weekly and she reports that she would like to restart because she felt better on it- continues to have fatigue and lack of energy- although she does admit that she is going through grieving stages since her brother passed two weeks ago. Review of Systems   Constitutional: Positive for fatigue. Negative for chills and fever. Respiratory: Negative for cough, shortness of breath and wheezing. Cardiovascular: Negative for chest pain, palpitations and leg swelling. Gastrointestinal: Negative for abdominal pain, constipation, diarrhea, nausea and vomiting. Genitourinary: Negative for difficulty urinating and frequency. Musculoskeletal: Negative for arthralgias and myalgias. Skin: Negative for rash. Neurological: Negative for dizziness and headaches.          PHQ Screening   PHQ over the last two weeks 2/20/2018   Little interest or pleasure in doing things Not at all   Feeling down, depressed or hopeless Not at all   Total Score PHQ 2 0         History  Past Medical History:   Diagnosis Date    Anemia     CKD (chronic kidney disease)     HTN (hypertension)     LVH (left ventricular hypertrophy) 2012    noted on ECHO, mild- EJF 74%    Myelodysplastic syndrome (HCC)     Vitamin D deficiency        Past Surgical History:   Procedure Laterality Date    HX HYSTERECTOMY      HX PARTIAL THYROIDECTOMY      goiter removed       Social History     Social History    Marital status:      Spouse name: N/A    Number of children: N/A    Years of education: N/A     Occupational History    Not on file. Social History Main Topics    Smoking status: Never Smoker    Smokeless tobacco: Never Used    Alcohol use No    Drug use: No    Sexual activity: Not on file     Other Topics Concern    Not on file     Social History Narrative       Family History   Problem Relation Age of Onset    Diabetes Other     Hypertension Other      Positive family history for essential hypertension       Allergies   Allergen Reactions    Codeine Drowsiness       Current Outpatient Prescriptions   Medication Sig Dispense Refill    aspirin (ASPIRIN LOW-STRENGTH) 81 mg chewable tablet Take 81 mg by mouth daily.  valsartan-hydrochlorothiazide (DIOVAN HCT) 160-25 mg per tablet Take 1 Tab by mouth daily. Indications: HYPERTENSION      multivitamin (ONE A DAY) tablet Take 1 Tab by mouth daily. Indications: VITAMIN DEFICIENCY PREVENTION      diazepam (VALIUM) 2 mg tablet Take 2 mg by mouth as needed. Health Maintenance and Screenings  *Medicare wellness done 1/2018    Advance Care Planning:   Patient was offered the opportunity to discuss advance care planning NO   Does patient have an Advance Directive: If no, did you provide information on Caring Connections?          Patient Care Team:  Patient Care Team:  Osvaldo Gomez NP as PCP - General (Nurse Practitioner)  Kellen Rao MD (Oncology)        LABS:    Results for orders placed or performed during the hospital encounter of 01/24/18   CBC W/O DIFF   Result Value Ref Range WBC 4.9 4.6 - 13.2 K/uL    RBC 2.99 (L) 4.20 - 5.30 M/uL    HGB 9.8 (L) 12.0 - 16.0 g/dL    HCT 30.9 (L) 35.0 - 45.0 %    .3 (H) 74.0 - 97.0 FL    MCH 32.8 24.0 - 34.0 PG    MCHC 31.7 31.0 - 37.0 g/dL    RDW 13.0 11.6 - 14.5 %    PLATELET 541 621 - 092 K/uL    MPV 9.9 9.2 - 62.1 FL   METABOLIC PANEL, COMPREHENSIVE   Result Value Ref Range    Sodium 141 136 - 145 mmol/L    Potassium 4.6 3.5 - 5.5 mmol/L    Chloride 107 100 - 108 mmol/L    CO2 27 21 - 32 mmol/L    Anion gap 7 3.0 - 18 mmol/L    Glucose 96 74 - 99 mg/dL    BUN 30 (H) 7.0 - 18 MG/DL    Creatinine 1.86 (H) 0.6 - 1.3 MG/DL    BUN/Creatinine ratio 16 12 - 20      GFR est AA 31 (L) >60 ml/min/1.73m2    GFR est non-AA 26 (L) >60 ml/min/1.73m2    Calcium 8.5 8.5 - 10.1 MG/DL    Bilirubin, total 0.6 0.2 - 1.0 MG/DL    ALT (SGPT) 13 13 - 56 U/L    AST (SGOT) 22 15 - 37 U/L    Alk. phosphatase 53 45 - 117 U/L    Protein, total 6.9 6.4 - 8.2 g/dL    Albumin 3.4 3.4 - 5.0 g/dL    Globulin 3.5 2.0 - 4.0 g/dL    A-G Ratio 1.0 0.8 - 1.7     TSH 3RD GENERATION   Result Value Ref Range    TSH 2.36 0.36 - 3.74 uIU/mL   VITAMIN D, 1, 25 DIHYDROXY   Result Value Ref Range    Calcitriol (Vit D 1, 25 di-OH) 32.4 19.9 - 79.3 pg/mL       RADIOLOGY:  None new to review        Physical Exam   Constitutional: She is oriented to person, place, and time. She appears well-developed and well-nourished. No distress. Neck: Normal range of motion. Neck supple. No thyromegaly present. Cardiovascular: Normal rate, regular rhythm and normal heart sounds. No murmur heard. Pulmonary/Chest: Effort normal and breath sounds normal. No respiratory distress. Abdominal: Soft. Bowel sounds are normal. There is no tenderness. Musculoskeletal: She exhibits no edema. Neurological: She is alert and oriented to person, place, and time. She exhibits normal muscle tone. Coordination and gait normal.   +sitting in wheelchair   Skin: Skin is warm and dry.          Vitals:    02/20/18 1327   BP: 112/56   Pulse: 77   Resp: 18   Temp: 97.3 °F (36.3 °C)   TempSrc: Oral   SpO2: 98%   Weight: 151 lb (68.5 kg)   Height: 5' 7\" (1.702 m)   PainSc:   0 - No pain         Assessment and Plan    Essential hypertension  *Controlled BP. Refill completed. - valsartan-hydroCHLOROthiazide (DIOVAN HCT) 160-25 mg per tablet; Take 1 Tab by mouth daily. Indications: hypertension  Dispense: 30 Tab; Refill: 6    LVH (left ventricular hypertrophy)  *Noted on past ECHO from past records. Mild in nature, due to patient age and controlled HTN, no further action needed. Stage 3 chronic kidney disease  *Noted. Avoidance of NSAIDs is necessary. Continue with hematology for management below. Anemia associated with chronic renal failure/ Myelodysplastic syndrome (HCC)/Chronic leukopenia  *Continue with hematology. Vitamin D deficiency  *Will restart back on Vit D weekly dosing as Vit D level at bottom of normal on last check and she reports feeling better while on it. - ergocalciferol (ERGOCALCIFEROL) 50,000 unit capsule; Take 1 Cap by mouth every seven (7) days. Dispense: 4 Cap; Refill: 11    Fatigue, unspecified type  *She has requested a B12 injection- she understands that this is not a medical indication for this medication and that it is an off-label use to help some with chronic fatigue. She opts to get injection though despite this information.   - VITAMIN B12 INJECTION  - THER/PROPH/DIAG INJECTION, SUBCUT/IM  - cyanocobalamin (VITAMIN B12) 1,000 mcg/mL injection; 1 mL by IntraMUSCular route once for 1 dose. Dispense: 1 Vial; Refill: 0        *Plan of care reviewed with patient. Patient in agreement with plan and expresses understanding. All questions answered and patient encouraged to call or RTO if further questions or concerns. Follow-up Disposition:  Return in about 6 months (around 8/20/2018) for chronic disease routine care- 30 min. Sooner if needed.

## 2018-02-20 NOTE — MR AVS SNAPSHOT
[facility] [address] [facility] [address] 
[phone] Patient: Gwendolyn Pop MRN: [id number] ZYC:[birth year] Visit Information Date & Time Provider Department Dept. Phone Encounter #  
 2/20/2018  1:30 PM Kaila Pinedo. [address] Alessio DiazAxson [phone] Follow-up Instructions Return in about 6 months (around 8/20/2018) for chronic disease routine care- 30 min. Sooner if needed. Your Appointments 2/23/2018  1:30 PM  
ROUTINE CARE with Isabela Lisa NP [address] TadeoSharif Singh [facility] Appt Note: chronic disease routine care- 30 min [address] 200 [facility]  
[phone]  
  
   
 [address]  
  
    
 5/30/2018 11:30 AM  
Office Visit with Alla Penaloza MD  
[address] [facility] Appt Note: 6 MONTHS; [address] [facility], [place] [address], [address] [place] [address] Upcoming Health Maintenance Date Due DTaP/Tdap/Td series (1 - Tdap) 4/3/1949 GLAUCOMA SCREENING Q2Y 4/3/1993 OSTEOPOROSIS SCREENING (DEXA) 4/3/1993 Pneumococcal 65+ High/Highest Risk (1 of 2 - PCV13) 4/3/1993 MEDICARE YEARLY EXAM 1/25/2019 Allergies as of 2/20/2018  Review Complete On: 1/26/2018 By: Isabela Lias NP Severity Noted Reaction Type Reactions Codeine High 09/24/2012   Systemic Drowsiness Current Immunizations  Never Reviewed No immunizations on file. Not reviewed this visit You Were Diagnosed With   
  
 Codes Comments Essential hypertension    -  Primary ICD-10-CM: I10 
ICD-9-CM: 401.9 LVH (left ventricular hypertrophy)     ICD-10-CM: I51.7 ICD-9-CM: 429.3 Stage 3 chronic kidney disease     ICD-10-CM: N18.3 ICD-9-CM: 659. 3 Anemia associated with chronic renal failure     ICD-10-CM: D63.1 ICD-9-CM: 285.21 Myelodysplastic syndrome ([facility] [address]     ICD-10-CM: D46.9 ICD-9-CM: 238.75 Chronic leukopenia     ICD-10-CM: D72.819 ICD-9-CM: 288.50 Vitamin D deficiency     ICD-10-CM: E55.9 ICD-9-CM: 268.9 Fatigue, unspecified type     ICD-10-CM: R53.83 ICD-9-CM: 780.79 Vitals BP Pulse Temp Resp Height(growth percentile) Weight(growth percentile) 112/56 (BP 1 Location: Left arm, BP Patient Position: Sitting) 77 97.3 °F (36.3 °C) (Oral) 18 5' 7\" (1.702 m) 151 lb (68.5 kg) SpO2 BMI OB Status Smoking Status 98% 23.65 kg/m2 Hysterectomy Never Smoker BMI and BSA Data Body Mass Index Body Surface Area  
 23.65 kg/m 2 1.8 m 2 Preferred Pharmacy Pharmacy Name Phone [address] [facility], [address] Your Updated Medication List  
  
   
This list is accurate as of: 2/20/18  1:53 PM.  Always use your most recent med list.  
  
  
  
  
 ASPIRIN LOW-STRENGTH 81 mg chewable tablet Generic drug:  aspirin Take 81 mg by mouth daily. cyanocobalamin 1,000 mcg/mL injection Commonly known as:  VITAMIN B12  
1 mL by IntraMUSCular route once for 1 dose. diazePAM 2 mg tablet Commonly known as:  VALIUM Take 2 mg by mouth as needed. ergocalciferol 50,000 unit capsule Commonly known as:  ERGOCALCIFEROL Take 1 Cap by mouth every seven (7) days. multivitamin tablet Commonly known as:  ONE A DAY Take 1 Tab by mouth daily. Indications: VITAMIN DEFICIENCY PREVENTION  
  
 valsartan-hydroCHLOROthiazide 160-25 mg per tablet Commonly known as:  DIOVAN HCT Take 1 Tab by mouth daily. Indications: hypertension Prescriptions Sent to Pharmacy  Refills  
 valsartan-hydroCHLOROthiazide (DIOVAN HCT) 160-25 mg per tablet 6  
 Sig: Take 1 Tab by mouth daily. Indications: hypertension Class: Normal  
 Pharmacy: [address] [facility], [place] [facility] Ph #: [phone] Route: Oral  
 ergocalciferol (ERGOCALCIFEROL) 50,000 unit capsule 11 Sig: Take 1 Cap by mouth every seven (7) days. Class: Normal  
 Pharmacy: [address] [facility], [address] [facility] Ph #: [phone] Route: Oral  
  
We Performed the Following THER/PROPH/DIAG INJECTION, SUBCUT/IM [id number] CPT(R)] VITAMIN B12 INJECTION [ [facility]] Follow-up Instructions Return in about 6 months (around 8/20/2018) for chronic disease routine care- 30 min. Sooner if needed. Patient Instructions Fatigue: Care Instructions Your Care Instructions Fatigue is a feeling of tiredness, exhaustion, or lack of energy. You may feel fatigue because of too much or not enough activity. It can also come from stress, lack of sleep, boredom, and poor diet. Many medical problems, such as viral infections, can cause fatigue. Emotional problems, especially depression, are often the cause of fatigue. Fatigue is most often a symptom of another problem. Treatment for fatigue depends on the cause. For example, if you have fatigue because you have a certain health problem, treating this problem also treats your fatigue. If depression or anxiety is the cause, treatment may help. Follow-up care is a key part of your treatment and safety. Be sure to make and go to all appointments, and call your doctor if you are having problems. It's also a good idea to know your test results and keep a list of the medicines you take. How can you care for yourself at home? · Get regular exercise. But don't overdo it. Go back and forth between rest and exercise. · Get plenty of rest. 
· Eat a healthy diet. Do not skip meals, especially breakfast. 
· Reduce your use of caffeine, tobacco, and alcohol.  Caffeine is most often found in coffee, tea, cola drinks, and chocolate. · Limit medicines that can cause fatigue. This includes tranquilizers and cold and allergy medicines. When should you call for help? Watch closely for changes in your health, and be sure to contact your doctor if: 
? · You have new symptoms such as fever or a rash. ? · Your fatigue gets worse. ? · You have been feeling down, depressed, or hopeless. Or you may have lost interest in things that you usually enjoy. ? · You are not getting better as expected. Where can you learn more? Go to http://javier-marylu.info/. Enter T002 in the search box to learn more about \"Fatigue: Care Instructions. \" Current as of: March 20, 2017 Content Version: 11.4 © 6945-4374 [employer]. Care instructions adapted under license by [employer] (which disclaims liability or warranty for this information). If you have questions about a medical condition or this instruction, always ask your healthcare professional. [place] [address] any warranty or liability for your use of this information. Introducing [facility]! [facility] introduces KeraFAST patient portal. Now you can access parts of your medical record, email your doctor's office, and request medication refills online. 1. In your internet browser, go to [email or web address]/[email or web address] 2. Click on the First Time User? Click Here link in the Sign In box. You will see the New Member Sign Up page. 3. Enter your KeraFAST Access Code exactly as it appears below. You will not need to use this code after youve completed the sign-up process. If you do not sign up before the expiration date, you must request a new code. · KeraFAST Access Code: [email or web address] Expires: 2/27/2018 10:55 AM 
 
4. Enter the last four digits of your Social Security Number (xxxx) and Date of Birth (mm/dd/yyyy) as indicated and click Submit.  You will be taken to the next sign-up page. 5. Create a Vyyo ID. This will be your Vyyo login ID and cannot be changed, so think of one that is secure and easy to remember. 6. Create a Vyyo password. You can change your password at any time. 7. Enter your Password Reset Question and Answer. This can be used at a later time if you forget your password. 8. Enter your e-mail address. You will receive e-mail notification when new information is available in [address]. 9. Click Sign Up. You can now view and download portions of your medical record. 10. Click the Download Summary menu link to download a portable copy of your medical information. If you have questions, please visit the Frequently Asked Questions section of the Vyyo website. Remember, Vyyo is NOT to be used for urgent needs. For medical emergencies, dial 911. Now available from your iPhone and Android! Please provide this summary of care documentation to your next provider. Your primary care clinician is listed as Darinel Frey. If you have any questions after today's visit, please call [phone].

## 2018-03-13 ENCOUNTER — TELEPHONE (OUTPATIENT)
Dept: FAMILY MEDICINE CLINIC | Age: 83
End: 2018-03-13

## 2018-03-13 NOTE — TELEPHONE ENCOUNTER
Received a page from [facility]. Patient was called and identified with full name and . She states that her lower leg are swollen and want to know if medication can be sent to her pharmacy. Advised her to elevate them up tonight and call the office tomorrow and talk to her PCP as I have never seen her so it will not be appropriate for me to call in any medication that is not on her med list. She verbalized understanding.

## 2018-03-14 ENCOUNTER — TELEPHONE (OUTPATIENT)
Dept: FAMILY MEDICINE CLINIC | Age: 83
End: 2018-03-14

## 2018-03-14 NOTE — TELEPHONE ENCOUNTER
Patient is calling regarding medication that she's not sure of. She hasn't had a refill on the medication for quite sometime so she's not sure of the medication name. It's for the swelling in her ankles and would like for the nurse to call her back when possible to discuss the name of the meds she may need.

## 2018-03-14 NOTE — TELEPHONE ENCOUNTER
Patient called stating she talked to the pharmacy and still had refills from a previous provider.  Disregard refill request

## 2018-08-22 ENCOUNTER — HOSPITAL ENCOUNTER (OUTPATIENT)
Dept: ONCOLOGY | Age: 83
Discharge: HOME | End: 2018-08-22

## 2018-08-22 DIAGNOSIS — D46.9 MYELODYSPLASTIC SYNDROME (HCC): ICD-10-CM

## 2018-08-30 ENCOUNTER — HOSPITAL ENCOUNTER (OUTPATIENT)
Dept: PHYSICAL THERAPY | Age: 83
Discharge: HOME | End: 2018-08-30
Payer: MEDICARE

## 2018-08-30 PROCEDURE — G8979 MOBILITY GOAL STATUS: HCPCS | Performed by: PHYSICAL THERAPIST

## 2018-08-30 PROCEDURE — 97110 THERAPEUTIC EXERCISES: CPT | Performed by: PHYSICAL THERAPIST

## 2018-08-30 PROCEDURE — 97162 PT EVAL MOD COMPLEX 30 MIN: CPT | Performed by: PHYSICAL THERAPIST

## 2018-08-30 PROCEDURE — G8978 MOBILITY CURRENT STATUS: HCPCS | Performed by: PHYSICAL THERAPIST

## 2018-08-30 NOTE — PROGRESS NOTES
PT DAILY TREATMENT NOTE - [facility]  Patient Name: Sumi Lewis Date:2018 : 4/3/1928 [x]  Patient  Verified Payor: [place] MEDICARE / Plan: Montez Cornelius / Product Type: Medicare / In time:11:05  Out time:12:05 Total Treatment Time (min): 60 Total Timed Codes (min): 40 
1:1 Treatment Time ( only): 40 Visit #: 1 of 12 Treatment Area: Other abnormalities of gait and mobility [R26.89] SUBJECTIVE Pain Level (0-10 scale): 7 Any medication changes, allergies to medications, adverse drug reactions, diagnosis change, or new procedure performed?: [x] No    [] Yes (see summary sheet for update) Subjective functional status/changes:   [] No changes reported See Evaluation. OBJECTIVE 20 min []Eval                  []Re-Eval  
 
 
40 min Therapeutic Exercise:  [x] See flow sheet :  
Rationale: increase ROM, increase strength and improve coordination to improve the patients ability to increase their functional activity level. With 
 [] TE 
 [] TA 
 [] neuro 
 [] other: Patient Education: [x] Review HEP [] Progressed/Changed HEP based on:  
[] positioning   [] body mechanics   [] transfers   [] heat/ice application   
[] other:   
 
Other Objective/Functional Measures: See Evaluation. Pain Level (0-10 scale) post treatment: 5-610 ASSESSMENT/Changes in Function: Patient with signs and symptoms consistent with balance and gait dysfunction. Patient has had a decreased activity level since January when she had her first cataract surgery. Her TUG test was 1:05. She is unable to perform a single leg stand (SLS). Her hip and knee ROM is preserved. She has weakness at both hips. Her gait is with a wider base of support, decreased selene and she uses a cane for assistance.  
 
Patient will continue to benefit from skilled PT services to modify and progress therapeutic interventions, address functional mobility deficits, address strength deficits, analyze and cue movement patterns, analyze and modify body mechanics/ergonomics, assess and modify postural abnormalities, address imbalance/dizziness and instruct in home and community integration to attain remaining goals. [x]  See Plan of Care 
[]  See progress note/recertification 
[]  See Discharge Summary Progress towards goals / Updated goals: 
Short Term Goals: To be accomplished in 2 weeks: 1. Patient will become proficient in her HEP and will be compliant in performing that program. 
Evaluation:  Patient given a written/illustrataed HEP. 
  
Long Term Goals: To be accomplished in 6 weeks: 1. Patient's pain level will be 1-2/10 with activity in order to improve patient's ability to perform normal ADLs. Evaluation:  3/10-8/10 2. Patient will demonstrate 4/5 strength in hip abduction, and knee flexion to increase ease of ADLs. Evaluation:  Hip Abduction 3/5; knee flexion 4-/5 3. Patient will increase FOTO score to 45 pts to increase functional mobility. Evaluation:  32 
4. Patient will be able to walk 100 feet without having to stop to rest to increase her ability to participate in her normal social activities. Evaluation:  Patient was able to complete 20 foot ambulation but had to sit down to rest. 
  
 
PLAN [x]  Upgrade activities as tolerated     [x]  Continue plan of care 
[]  Update interventions per flow sheet      
[]  Discharge due to:_ 
[]  Other:_ Carlos Simpson PT 8/30/2018  2:20 PM 
 
Future Appointments Date Time Provider Leeroy Dominguez 9/5/2018 3:00 PM Carlos Simpson PT [facility]  
9/7/2018 10:30 AM Carlos Simpson PT [facility]  
9/10/2018 2:00 PM Janny Moya PTA [facility]  
9/14/2018 10:00 AM Carlos Simpson PT [facility]  
9/17/2018 12:00 PM Carlos Simpson PT [facility]  
9/19/2018 11:30 AM Carlos Simpson PT [facility]  
9/24/2018 12:30 PM MARIMAR Elizondo [facility]  
9/28/2018 12:00 PM MARIMAR Elizondo [facility]  
 10/1/2018 12:30 PM Sav Rogel, PT [facility]  
10/3/2018 1:30 PM Sav Rogel PT [facility]  
10/8/2018 11:30 AM Sav Rogel PT [facility]  
10/12/2018 11:30 AM Sav Rogel PT [facility]

## 2018-08-30 NOTE — PROGRESS NOTES
[facility] [address] [facility] [address], [address]  
[phone] fax Plan of Care/ Statement of Necessity for Physical Therapy Services Patient name: Pamela Faria Start of Care: 2018 Referral source: Eduard Thompson MD : 4/3/1928 Medical Diagnosis: Other abnormalities of gait and mobility [R26.89] Onset Date:2018 Treatment Diagnosis: Gait and balance dysfunction. Prior Hospitalization: see medical history Provider#: [id number] Medications: Verified on Patient summary List  
 Comorbidities: Anxiety/Panic Disorder, Back pain, Hearing Impaired, HBP, recent cataract surgery. Prior Level of Function: Was driving, able to go to [place], was able to do the food shopping. The Plan of Care and following information is based on the information from the initial evaluation. Assessment/ key information: Patient with signs and symptoms consistent with balance and gait dysfunction. Patient has had a decreased activity level since January when she had her first cataract surgery. Her TUG test was 1:05. She is unable to perform a single leg stand (SLS). Her hip and knee ROM is preserved. She has weakness at both hips. Her gait is with a wider base of support, decreased selene and she uses a cane for assistance. Patient will benefit from a program of skilled physical therapy to include therapeutic exercises to address strength deficits, therapeutic activities to improve functional mobility, neuromuscular reeducation to address balance, coordination and proprioception, manual therapy to address ROM and tissue extensibility and modalities as indicated. All questions were answered.  
 
Evaluation Complexity History HIGH Complexity :3+ comorbidities / personal factors will impact the outcome/ POC ; Examination MEDIUM Complexity : 3 Standardized tests and measures addressing body structure, function, activity limitation and / or participation in recreation  ;Presentation HIGH Complexity : Unstable and unpredictable characteristics  ; Clinical Decision Making MEDIUM Complexity : FOTO score of 26-74 Overall Complexity Rating: MEDIUM Problem List: pain affecting function, decrease strength, impaired gait/ balance, decrease ADL/ functional abilitiies and decrease activity tolerance Treatment Plan may include any combination of the following: Therapeutic exercise, Therapeutic activities, Neuromuscular re-education, Physical agent/modality, Gait/balance training and Manual therapy Patient / Family readiness to learn indicated by: asking questions, trying to perform skills and interest 
Persons(s) to be included in education: patient (P) Barriers to Learning/Limitations: none Patient Goal (s): I would like to get out of the house more and get back to my Yarsanism and social activities Patient Self Reported Health Status: poor Rehabilitation Potential: fair Short Term Goals: To be accomplished in 2 weeks: 1. Patient will become proficient in her HEP and will be compliant in performing that program. 
 
Long Term Goals: To be accomplished in 6 weeks: 1. Patient's pain level will be 1-2/10 with activity in order to improve patient's ability to perform normal ADLs. 2. Patient will demonstrate 4/5 strength in hip abduction, and knee flexion to increase ease of ADLs. 3. Patient will increase FOTO score to 45 pts to increase functional mobility. 4. Patient will be able to walk 100 feet without having to stop to rest to increase her ability to participate in her normal social activities. Frequency / Duration: Patient to be seen 2 times per week for 6 weeks. Patient/ Caregiver education and instruction: Diagnosis, prognosis, exercises 
 [x]  Plan of care has been reviewed with EFRAIN 
 
G-Codes (GP) Mobility  Current  CL= 60-79%   Goal  CK= 40-59% The severity rating is based on clinical judgment and the FOTO score. Certification Period: 8/30/18 to 10/28/18 Rajat Seymour, PT 8/30/2018 2:08 PM 
________________________________________________________________________ I certify that the above Therapy Services are being furnished while the patient is under my care. I agree with the treatment plan and certify that this therapy is necessary. [de-identified] Signature:____________Date:_________TIME:________ 
 
[employer], Date and Time must be completed for valid certification ** Please sign and return to [facility] [address] [facility] [place], [facility]  
[phone] fax

## 2018-09-05 ENCOUNTER — APPOINTMENT (OUTPATIENT)
Dept: PHYSICAL THERAPY | Age: 83
End: 2018-09-05
Payer: MEDICARE

## 2018-09-07 ENCOUNTER — HOSPITAL ENCOUNTER (OUTPATIENT)
Dept: PHYSICAL THERAPY | Age: 83
Discharge: HOME | End: 2018-09-07
Payer: MEDICARE

## 2018-09-07 PROCEDURE — 97530 THERAPEUTIC ACTIVITIES: CPT | Performed by: PHYSICAL THERAPIST

## 2018-09-07 NOTE — PROGRESS NOTES
PT DAILY TREATMENT NOTE - [facility]  Patient Name: Burgess Mark Date:2018 : 4/3/1928 [x]  Patient  Verified Payor: [place] MEDICARE / Plan: Montez Cornelius / Product Type: Medicare / In time:10:40  Out time:11:23 Total Treatment Time (min): 43 Total Timed Codes (min): 43 
1:1 Treatment Time ( [address] only): 20 Visit #: 2 of 12 Treatment Area: Other abnormalities of gait and mobility [R26.89] SUBJECTIVE Pain Level (0-10 scale): 0/10 Any medication changes, allergies to medications, adverse drug reactions, diagnosis change, or new procedure performed?: [x] No    [] Yes (see summary sheet for update) Subjective functional status/changes:   [] No changes reported Patient notes she is having fewer issues with walking than she did prior to coming to therapy. OBJECTIVE 20 min Therapeutic Exercise:  [x] See flow sheet :  
Rationale: increase ROM and increase strength to improve the patients ability to increase their functional activity level. 23 min Therapeutic Activity:  [x]  See flow sheet :  
Rationale: increase strength, improve coordination, improve balance and increase proprioception  to improve the patients ability to ambulate safely on all surfaces. With 
 [] TE 
 [] TA 
 [] neuro 
 [] other: Patient Education: [x] Review HEP [] Progressed/Changed HEP based on:  
[] positioning   [] body mechanics   [] transfers   [] heat/ice application   
[] other:   
 
Other Objective/Functional Measures: Patient ambulates with straight cane for assistance. Her gait is unsteady. She noted some dizziness during therapy and she admitted she had only toast for breakfast.  We had to end her therapy session early due to fatigue. Pain Level (0-10 scale) post treatment: 0/10 ASSESSMENT/Changes in Function: Patient continues with altered gait and balance and decreased endurance to gentle exercises. Patient will continue to benefit from skilled PT services to modify and progress therapeutic interventions, address functional mobility deficits, address ROM deficits, address strength deficits, analyze and address soft tissue restrictions, analyze and cue movement patterns, analyze and modify body mechanics/ergonomics, assess and modify postural abnormalities and address imbalance/dizziness to attain remaining goals. [x]  See Plan of Care 
[]  See progress note/recertification 
[]  See Discharge Summary Progress towards goals / Updated goals: 
Short Term Goals: To be accomplished in 2 weeks: 1.  Patient will become proficient in her HEP and will be compliant in performing that program. 
Evaluation:  Patient given a written/illustrataed HEP.    
Long Term Goals: To be accomplished in 6 weeks: 1. Patient's pain level will be 1-2/10 with activity in order to improve patient's ability to perform normal ADLs. Evaluation:  3/10-8/10 2. Patient will demonstrate 4/5 strength in hip abduction, and knee flexion to increase ease of ADLs. Evaluation:  Hip Abduction 3/5; knee flexion 4-/5 3. Patient will increase FOTO score to 45 pts to increase functional mobility. Evaluation:  32 
4. Patient will be able to walk 100 feet without having to stop to rest to increase her ability to participate in her normal social activities. Evaluation:  Patient was able to complete 20 foot ambulation but had to sit down to rest. 
 
PLAN [x]  Upgrade activities as tolerated     [x]  Continue plan of care 
[]  Update interventions per flow sheet      
[]  Discharge due to:_ 
[]  Other:_ Terry Li PT 9/7/2018  10:39 AM 
 
Future Appointments Date Time Provider Leeroy Dominguez 9/10/2018 2:00 PM Norville Cowden, PTA [facility]  
9/14/2018 10:00 AM Terry Li PT [facility]  
9/17/2018 12:00 PM Terry Li PT [facility]  
9/19/2018 11:30 AM Terry Li PT [facility]  
 9/24/2018 12:30 PM Terry Li, PT [facility]  
9/28/2018 12:00 PM Terry Li, PT [facility]  
10/1/2018 12:30 PM Terry Li, PT [facility]  
10/3/2018 1:30 PM Terry Li, PT [facility]  
10/8/2018 11:30 AM Terry Li, PT [facility]  
10/12/2018 11:30 AM Terry Li, PT [facility]

## 2018-09-10 ENCOUNTER — HOSPITAL ENCOUNTER (OUTPATIENT)
Dept: PHYSICAL THERAPY | Age: 83
Discharge: HOME | End: 2018-09-10
Payer: MEDICARE

## 2018-09-10 PROCEDURE — 97530 THERAPEUTIC ACTIVITIES: CPT | Performed by: PHYSICAL THERAPIST

## 2018-09-10 PROCEDURE — 97110 THERAPEUTIC EXERCISES: CPT | Performed by: PHYSICAL THERAPIST

## 2018-09-10 NOTE — PROGRESS NOTES
PT DAILY TREATMENT NOTE - [facility]  Patient Name: Benjamin Nagel Date:9/10/2018 : 4/3/1928 [x]  Patient  Verified Payor: [place] / Plan: Montez Cornelius / Product Type: Medicare / In time:4:00  Out time:5:00 Total Treatment Time (min): 60 Total Timed Codes (min): 60 
1:1 Treatment Time ( only): 30 Visit #: 3 of 12 Treatment Area: Other abnormalities of gait and mobility [R26.89] SUBJECTIVE Pain Level (0-10 scale): 0/10 Any medication changes, allergies to medications, adverse drug reactions, diagnosis change, or new procedure performed?: [x] No    [] Yes (see summary sheet for update) Subjective functional status/changes:   [] No changes reported Patient notes some achiness in her legs but denies any pain. She reports compliance with her HEP. OBJECTIVE 30 min Therapeutic Exercise:  [] See flow sheet :  
Rationale: increase ROM and increase strength to improve the patients ability to increase their functional activity level. 30 min Therapeutic Activity:  []  See flow sheet :  
Rationale: increase strength, improve coordination and improve balance  to improve the patients ability to ambulate safely on all surfaces. With 
 [] TE 
 [] TA 
 [] neuro 
 [] other: Patient Education: [x] Review HEP [] Progressed/Changed HEP based on:  
[] positioning   [] body mechanics   [] transfers   [] heat/ice application   
[] other:   
 
Other Objective/Functional Measures: Patient ambulates with a slightly unsteady gait. She is using a cane for assistance. She was able to complete all of her TE today. Pain Level (0-10 scale) post treatment: 0/10 ASSESSMENT/Changes in Function: Patient demonstrated improved endurance today. Continues to exhibit altered gait and balance.  
 
Patient will continue to benefit from skilled PT services to modify and progress therapeutic interventions, address functional mobility deficits, address ROM deficits, address strength deficits, analyze and address soft tissue restrictions, analyze and cue movement patterns, assess and modify postural abnormalities and address imbalance/dizziness to attain remaining goals. [x]  See Plan of Care 
[]  See progress note/recertification 
[]  See Discharge Summary Progress towards goals / Updated goals: 
Short Term Goals: To be accomplished in 2 weeks: 1.  Patient will become proficient in her HEP and will be compliant in performing that program. 
Evaluation:  Patient given a written/illustrataed HEP. Current:  Patient reports compliance with her HEP. 9/10/18 
   
Long Term Goals: To be accomplished in 6 weeks: 1. Patient's pain level will be 1-2/10 with activity in order to improve patient's ability to perform normal ADLs. Evaluation:  3/10-8/10 2. Patient will demonstrate 4/5 strength in hip abduction, and knee flexion to increase ease of ADLs. Evaluation:  Hip Abduction 3/5; knee flexion 4-/5 3. Patient will increase FOTO score to 45 pts to increase functional mobility. Evaluation:  32 
4. Patient will be able to walk 100 feet without having to stop to rest to increase her ability to participate in her normal social activities. Evaluation:  Patient was able to complete 20 foot ambulation but had to sit down to rest. 
  
PLAN [x]  Upgrade activities as tolerated     [x]  Continue plan of care 
[]  Update interventions per flow sheet      
[]  Discharge due to:_ 
[]  Other:_ Geena Lake PT 9/10/2018  4:40 PM 
 
Future Appointments Date Time Provider Leeroy Dominguez 9/14/2018 10:00 AM Geena Lake PT [facility]  
9/17/2018 12:00 PM Geena Lake PT [facility]  
9/19/2018 11:30 AM Geena Lake PT [facility]  
9/24/2018 12:30 PM Geena Lake PT [facility]  
9/28/2018 12:00 PM Geena Lake PT [facility]  
10/1/2018 12:30 PM Geena Lake PT [facility]  
10/3/2018 1:30 PM [facility]ena Lake, [facility]  
 10/8/2018 11:30 AM Darinel Duarte, PT [facility]  
10/12/2018 11:30 AM Darinel Duarte, PT [facility]

## 2018-09-14 ENCOUNTER — APPOINTMENT (OUTPATIENT)
Dept: PHYSICAL THERAPY | Age: 83
End: 2018-09-14
Payer: MEDICARE

## 2018-09-17 ENCOUNTER — APPOINTMENT (OUTPATIENT)
Dept: PHYSICAL THERAPY | Age: 83
End: 2018-09-17
Payer: MEDICARE

## 2018-09-19 ENCOUNTER — APPOINTMENT (OUTPATIENT)
Dept: PHYSICAL THERAPY | Age: 83
End: 2018-09-19
Payer: MEDICARE

## 2018-09-24 ENCOUNTER — HOSPITAL ENCOUNTER (OUTPATIENT)
Dept: PHYSICAL THERAPY | Age: 83
Discharge: HOME | End: 2018-09-24
Payer: MEDICARE

## 2018-09-24 PROCEDURE — 97110 THERAPEUTIC EXERCISES: CPT | Performed by: PHYSICAL THERAPIST

## 2018-09-24 PROCEDURE — 97112 NEUROMUSCULAR REEDUCATION: CPT | Performed by: PHYSICAL THERAPIST

## 2018-09-28 ENCOUNTER — HOSPITAL ENCOUNTER (OUTPATIENT)
Dept: PHYSICAL THERAPY | Age: 83
Discharge: HOME | End: 2018-09-28
Payer: MEDICARE

## 2018-09-28 PROCEDURE — 97112 NEUROMUSCULAR REEDUCATION: CPT | Performed by: PHYSICAL THERAPIST

## 2018-09-28 PROCEDURE — 97110 THERAPEUTIC EXERCISES: CPT | Performed by: PHYSICAL THERAPIST

## 2018-09-28 NOTE — PROGRESS NOTES
PT DAILY TREATMENT NOTE - [facility]  Patient Name: Richard Zarco Date:2018 : 4/3/1928 [x]  Patient  Verified Payor: [place] MEDICARE / Plan: Montez Cornelius / Product Type: Medicare / In time:12:05  Out time:12:50 Total Treatment Time (min): 45 Total Timed Codes (min): 45 
1:1 Treatment Time ( only): 45 Visit #: 5 of 12 Treatment Area: Other abnormalities of gait and mobility [R26.89] SUBJECTIVE Pain Level (0-10 scale): 0/10. Any medication changes, allergies to medications, adverse drug reactions, diagnosis change, or new procedure performed?: [x] No    [] Yes (see summary sheet for update) Subjective functional status/changes:   [] No changes reported Patient notes some discomfort in her back sometime after she does her exercises. She notes she has not fallen but she continues to note problems with ambulation and balance. OBJECTIVE 30 min Therapeutic Exercise:  [] See flow sheet :  
Rationale: increase ROM and increase strength to improve the patients ability to increase their functional activity level. 15 min Neuromuscular Re-education:  []  See flow sheet :  
Rationale: increase strength, improve coordination, improve balance and increase proprioception  to improve the patients ability to ambulate safely on all surfaces. With 
 [] TE 
 [] TA 
 [] neuro 
 [] other: Patient Education: [x] Review HEP [] Progressed/Changed HEP based on:  
[] positioning   [] body mechanics   [] transfers   [] heat/ice application   
[] other:   
 
Other Objective/Functional Measures: patient has unsteady gait and she uses a cane for assistance. Standing balance in Rhomberg position she wavers and at times has to grab the parallel bars to maintain balance. Pain Level (0-10 scale) post treatment: 0/10 ASSESSMENT/Changes in Function: Patient continues to demonstrate balance and gait deficits. Patient will continue to benefit from skilled PT services to modify and progress therapeutic interventions, address functional mobility deficits, address ROM deficits, address strength deficits, analyze and address soft tissue restrictions, analyze and cue movement patterns, analyze and modify body mechanics/ergonomics and address imbalance/dizziness to attain remaining goals. [x]  See Plan of Care 
[]  See progress note/recertification 
[]  See Discharge Summary Progress towards goals / Updated goals: 
Short Term Goals: To be accomplished in 2 weeks: 1.  Patient will become proficient in her HEP and will be compliant in performing that program. 
Evaluation:  Patient given a written/illustrataed HEP. Current:  Patient reports compliance with her HEP.  9/10/18 
   
Long Term Goals: To be accomplished in 6 weeks: 1. Patient's pain level will be 1-2/10 with activity in order to improve patient's ability to perform normal ADLs. Evaluation:  3/10-8/10 Current:  0/10-9/10. However she does not refer to this as pain but an \"ache\". 9/24/18 2. Patient will demonstrate 4/5 strength in hip abduction, and knee flexion to increase ease of ADLs. Evaluation:  Hip Abduction 3/5; knee flexion 4-/5 3. Patient will increase FOTO score to 45 pts to increase functional mobility. Evaluation:  32 
4. Patient will be able to walk 100 feet without having to stop to rest to increase her ability to participate in her normal social activities. Evaluation:  Patient was able to complete 20 foot ambulation but had to sit down to rest. 
Current:  Patient is able to ambulate 30 feet without rest.  9/24/18 PLAN [x]  Upgrade activities as tolerated     [x]  Continue plan of care 
[]  Update interventions per flow sheet      
[]  Discharge due to:_ 
[]  Other:_ Olu Hernandez PT 9/28/2018  3:47 PM 
 
Future Appointments Date Time Provider Leeroy Salmoni 10/1/2018 12:30 PM Olu Hernandez PT [facility]  
 10/3/2018 1:30 PM Paresh Samuels, PT [facility]  
10/8/2018 11:30 AM Paresh Samuels, [facility]  
10/12/2018 11:30 AM Paresh Samuels, PT [facility]

## 2018-10-01 ENCOUNTER — HOSPITAL ENCOUNTER (OUTPATIENT)
Dept: PHYSICAL THERAPY | Age: 83
Discharge: HOME | End: 2018-10-01
Payer: MEDICARE

## 2018-10-01 PROCEDURE — 97112 NEUROMUSCULAR REEDUCATION: CPT | Performed by: PHYSICAL THERAPIST

## 2018-10-01 PROCEDURE — 97110 THERAPEUTIC EXERCISES: CPT | Performed by: PHYSICAL THERAPIST

## 2018-10-01 NOTE — PROGRESS NOTES
PT DAILY TREATMENT NOTE - [facility]  Patient Name: Lilia Siegel Date:10/1/2018 : 4/3/1928 [x]  Patient  Verified Payor: [place] MEDICARE / Plan: Montez Calzada [facility] / Product Type: Medicare / In time:12:29  Out time:1:25 Total Treatment Time (min): 56 Total Timed Codes (min): 56 
1:1 Treatment Time ( [address] only): 56 Visit #: 6 of 12 Treatment Area: Other abnormalities of gait and mobility [R26.89] SUBJECTIVE Pain Level (0-10 scale): 4/10 Any medication changes, allergies to medications, adverse drug reactions, diagnosis change, or new procedure performed?: [x] No    [] Yes (see summary sheet for update) Subjective functional status/changes:   [] No changes reported Patient reports that she did not have a busy weekend, did not do much walking outside of her home. OBJECTIVE 30 min Therapeutic Exercise:  [] See flow sheet :  
Rationale: increase ROM and increase strength to improve the patients ability to increase their functional activity level. 26 min Neuromuscular Re-education:  []  See flow sheet :  
Rationale: increase strength, improve coordination, improve balance and increase proprioception  to improve the patients ability to ambulate safely on all surfaces. With 
 [] TE 
 [] TA 
 [] neuro 
 [] other: Patient Education: [x] Review HEP [] Progressed/Changed HEP based on:  
[] positioning   [] body mechanics   [] transfers   [] heat/ice application   
[] other:   
 
Other Objective/Functional Measures: Patient ambulates with a cane for assistance. She has an unsteady gait. Dynamic balance on [employer] in rhomberg stance is challenging and she has to grasp parallel bars to maintain balance. Pain Level (0-10 scale) post treatment: 0/10 ASSESSMENT/Changes in Function: Patient continues with gait and balance dysfunction.  
 
Patient will continue to benefit from skilled PT services to modify and progress therapeutic interventions, address functional mobility deficits, address ROM deficits, address strength deficits, analyze and address soft tissue restrictions, analyze and cue movement patterns, analyze and modify body mechanics/ergonomics and address imbalance/dizziness to attain remaining goals. [x]  See Plan of Care 
[]  See progress note/recertification 
[]  See Discharge Summary Progress towards goals / Updated goals: 
Short Term Goals: To be accomplished in 2 weeks: 1.  Patient will become proficient in her HEP and will be compliant in performing that program. 
Evaluation:  Patient given a written/illustrataed HEP. Current:  Patient reports compliance with her HEP.  9/10/18 
   
Long Term Goals: To be accomplished in 6 weeks: 1. Patient's pain level will be 1-2/10 with activity in order to improve patient's ability to perform normal ADLs. Evaluation:  3/10-8/10 Current:  0/10-9/10[place] she does not refer to this as pain but an \"ache\". 9/24/18 2. Patient will demonstrate 4/5 strength in hip abduction, and knee flexion to increase ease of ADLs. Evaluation:  Hip Abduction 3/5; knee flexion 4-/5 3. Patient will increase FOTO score to 45 pts to increase functional mobility. Evaluation:  32 
4. Patient will be able to walk 100 feet without having to stop to rest to increase her ability to participate in her normal social activities. Evaluation:  Patient was able to complete 20 foot ambulation but had to sit down to rest. 
Current:  Patient is able to ambulate 50 feet without rest.  10/1/18 PLAN [x]  Upgrade activities as tolerated     [x]  Continue plan of care 
[]  Update interventions per flow sheet      
[]  Discharge due to:_ 
[]  Other:_ Lennox Henderson PT 10/1/2018  12:35 PM 
 
Future Appointments Date Time Provider Leeroy Dominguez 10/3/2018 1:30 PM Lennox Henderson PT [facility]  
10/8/2018 11:30 AM Lennox Henderson PT [facility]  
 10/12/2018 11:30 AM lEy Walters PT [facility]

## 2018-10-03 ENCOUNTER — APPOINTMENT (OUTPATIENT)
Dept: PHYSICAL THERAPY | Age: 83
End: 2018-10-03
Payer: MEDICARE

## 2018-10-05 ENCOUNTER — HOSPITAL ENCOUNTER (OUTPATIENT)
Dept: PHYSICAL THERAPY | Age: 83
End: 2018-10-05
Payer: MEDICARE

## 2018-10-08 ENCOUNTER — APPOINTMENT (OUTPATIENT)
Dept: PHYSICAL THERAPY | Age: 83
End: 2018-10-08
Payer: MEDICARE

## 2018-10-12 ENCOUNTER — APPOINTMENT (OUTPATIENT)
Dept: PHYSICAL THERAPY | Age: 83
End: 2018-10-12
Payer: MEDICARE

## 2018-11-29 NOTE — PROGRESS NOTES
[facility] [address] [address] [place], [address] [place]  
[phone] fax Discharge Summary Patient name: Klaus Blanton Start of Care: 2018 Referral source: Tamika Rueda MD : 4/3/1928 Medical/Treatment Diagnosis: Other abnormalities of gait and mobility [R26.89] Payor: Guy [place] / Plan: [place] MEDICARE PART A & B / Product Type: Medicare /  Onset Date:2018 Prior Hospitalization: see medical history Provider#: [id number] Medications: Verified on Patient Summary List   
Comorbidities: Anxiety/Panic Disorder, Back pain, Hearing Impaired, HBP, recent cataract surgery. Prior Level of Function: Was driving, able to go to [employer], was able to do the food shopping. Visits from Start of Care: 6    Missed Visits: 7 Reporting Period : 2018 to 10/10/2018 Summary of Care: 
Goal: Patient's pain level will be 1-2/10 with activity in order to improve patient's ability to perform normal ADLs. Status at last note/certification: -. Status at discharge: not met Goal: Patient will demonstrate 4/5 strength in hip abduction, and knee flexion to increase ease of ADLs. Status at last note/certification: Hip Abduction 3/5; knee flexion 4-/5 Status at discharge: not met Goal: Patient will increase FOTO score to 45 pts to increase functional mobility. Status at last note/certification: 32 Status at discharge: not met Goal: Patient will be able to walk 100 feet without having to stop to rest to increase her ability to participate in her normal social activities. Status at last note/certification: Patient is able to ambulate 50 feet without rest. 
Status at discharge: not met ASSESSMENT/RECOMMENDATIONS: 
[x]Discontinue therapy: []Patient has reached or is progressing toward set goals []Patient is non-compliant or has abdicated [x]Due to lack of appreciable progress towards set goals, she was also having other medical problems.  
 
Babak Pruitt, PT 11/29/2018 4:51 PM

## 2019-03-26 ENCOUNTER — HOSPITAL ENCOUNTER (OUTPATIENT)
Dept: LAB | Age: 84
Discharge: HOME | End: 2019-03-26
Payer: MEDICARE

## 2019-03-26 ENCOUNTER — OFFICE VISIT (OUTPATIENT)
Dept: FAMILY MEDICINE CLINIC | Age: 84
End: 2019-03-26

## 2019-03-26 VITALS
SYSTOLIC BLOOD PRESSURE: 166 MMHG | HEIGHT: 67 IN | RESPIRATION RATE: 16 BRPM | TEMPERATURE: 97.4 F | OXYGEN SATURATION: 96 % | BODY MASS INDEX: 23.23 KG/M2 | WEIGHT: 148 LBS | HEART RATE: 74 BPM | DIASTOLIC BLOOD PRESSURE: 80 MMHG

## 2019-03-26 DIAGNOSIS — Z13.0 SCREENING FOR ENDOCRINE, METABOLIC AND IMMUNITY DISORDER: ICD-10-CM

## 2019-03-26 DIAGNOSIS — R53.83 FATIGUE, UNSPECIFIED TYPE: ICD-10-CM

## 2019-03-26 DIAGNOSIS — R51.9 NONINTRACTABLE EPISODIC HEADACHE, UNSPECIFIED HEADACHE TYPE: ICD-10-CM

## 2019-03-26 DIAGNOSIS — Z13.228 SCREENING FOR ENDOCRINE, METABOLIC AND IMMUNITY DISORDER: ICD-10-CM

## 2019-03-26 DIAGNOSIS — I10 ESSENTIAL HYPERTENSION: Primary | ICD-10-CM

## 2019-03-26 DIAGNOSIS — Z13.29 SCREENING FOR ENDOCRINE, METABOLIC AND IMMUNITY DISORDER: ICD-10-CM

## 2019-03-26 DIAGNOSIS — F32.9 REACTIVE DEPRESSION: ICD-10-CM

## 2019-03-26 DIAGNOSIS — D72.819 CHRONIC LEUKOPENIA: ICD-10-CM

## 2019-03-26 DIAGNOSIS — K59.00 CONSTIPATION, UNSPECIFIED CONSTIPATION TYPE: ICD-10-CM

## 2019-03-26 DIAGNOSIS — D46.9 MYELODYSPLASTIC SYNDROME (HCC): ICD-10-CM

## 2019-03-26 DIAGNOSIS — E55.9 VITAMIN D DEFICIENCY: ICD-10-CM

## 2019-03-26 DIAGNOSIS — N18.9 ANEMIA ASSOCIATED WITH CHRONIC RENAL FAILURE: ICD-10-CM

## 2019-03-26 DIAGNOSIS — F43.21 GRIEVING: ICD-10-CM

## 2019-03-26 DIAGNOSIS — I51.7 LVH (LEFT VENTRICULAR HYPERTROPHY): ICD-10-CM

## 2019-03-26 DIAGNOSIS — N18.30 STAGE 3 CHRONIC KIDNEY DISEASE (HCC): ICD-10-CM

## 2019-03-26 DIAGNOSIS — D63.1 ANEMIA ASSOCIATED WITH CHRONIC RENAL FAILURE: ICD-10-CM

## 2019-03-26 DIAGNOSIS — Z01.89 ENCOUNTER FOR LABORATORY EXAMINATION: ICD-10-CM

## 2019-03-26 DIAGNOSIS — I10 ESSENTIAL HYPERTENSION: ICD-10-CM

## 2019-03-26 LAB
ALBUMIN SERPL-MCNC: 3.9 G/DL (ref 3.4–5)
ALBUMIN/GLOB SERPL: 1.2 {RATIO} (ref 0.8–1.7)
ALP SERPL-CCNC: 55 U/L (ref 45–117)
ALT SERPL-CCNC: 16 U/L (ref 13–56)
ANION GAP SERPL CALC-SCNC: 8 MMOL/L (ref 3–18)
APPEARANCE UR: CLEAR
AST SERPL-CCNC: 23 U/L (ref 15–37)
BACTERIA URNS QL MICRO: ABNORMAL /HPF
BILIRUB SERPL-MCNC: 0.8 MG/DL (ref 0.2–1)
BILIRUB UR QL: NEGATIVE
BUN SERPL-MCNC: 22 MG/DL (ref 7–18)
BUN/CREAT SERPL: 17 (ref 12–20)
CALCIUM SERPL-MCNC: 8.5 MG/DL (ref 8.5–10.1)
CHLORIDE SERPL-SCNC: 107 MMOL/L (ref 100–108)
CO2 SERPL-SCNC: 27 MMOL/L (ref 21–32)
COLOR UR: YELLOW
CREAT SERPL-MCNC: 1.32 MG/DL (ref 0.6–1.3)
EPITH CASTS URNS QL MICRO: ABNORMAL /LPF (ref 0–5)
ERYTHROCYTE [DISTWIDTH] IN BLOOD BY AUTOMATED COUNT: 12.1 % (ref 11.6–14.5)
GLOBULIN SER CALC-MCNC: 3.2 G/DL (ref 2–4)
GLUCOSE SERPL-MCNC: 90 MG/DL (ref 74–99)
GLUCOSE UR STRIP.AUTO-MCNC: NEGATIVE MG/DL
HCT VFR BLD AUTO: 33.3 % (ref 35–45)
HGB BLD-MCNC: 10.6 G/DL (ref 12–16)
HGB UR QL STRIP: ABNORMAL
KETONES UR QL STRIP.AUTO: NEGATIVE MG/DL
LEUKOCYTE ESTERASE UR QL STRIP.AUTO: ABNORMAL
MCH RBC QN AUTO: 33.4 PG (ref 24–34)
MCHC RBC AUTO-ENTMCNC: 31.8 G/DL (ref 31–37)
MCV RBC AUTO: 105 FL (ref 74–97)
NITRITE UR QL STRIP.AUTO: NEGATIVE
PH UR STRIP: 6 [PH] (ref 5–8)
PLATELET # BLD AUTO: 169 K/UL (ref 135–420)
PMV BLD AUTO: 10.1 FL (ref 9.2–11.8)
POTASSIUM SERPL-SCNC: 4.3 MMOL/L (ref 3.5–5.5)
PROT SERPL-MCNC: 7.1 G/DL (ref 6.4–8.2)
PROT UR STRIP-MCNC: ABNORMAL MG/DL
RBC # BLD AUTO: 3.17 M/UL (ref 4.2–5.3)
RBC #/AREA URNS HPF: ABNORMAL /HPF (ref 0–5)
SODIUM SERPL-SCNC: 142 MMOL/L (ref 136–145)
SP GR UR REFRACTOMETRY: 1.02 (ref 1–1.03)
TSH SERPL DL<=0.05 MIU/L-ACNC: 2.1 UIU/ML (ref 0.36–3.74)
UROBILINOGEN UR QL STRIP.AUTO: 0.2 EU/DL (ref 0.2–1)
WBC # BLD AUTO: 4.2 K/UL (ref 4.6–13.2)
WBC URNS QL MICRO: ABNORMAL /HPF (ref 0–4)

## 2019-03-26 PROCEDURE — 81001 URINALYSIS AUTO W/SCOPE: CPT

## 2019-03-26 PROCEDURE — 80053 COMPREHEN METABOLIC PANEL: CPT

## 2019-03-26 PROCEDURE — 85027 COMPLETE CBC AUTOMATED: CPT

## 2019-03-26 PROCEDURE — 82652 VIT D 1 25-DIHYDROXY: CPT

## 2019-03-26 PROCEDURE — 84443 ASSAY THYROID STIM HORMONE: CPT

## 2019-03-26 RX ORDER — LOSARTAN POTASSIUM 100 MG/1
100 TABLET ORAL DAILY
Qty: 30 TAB | Refills: 1 | Status: SHIPPED | OUTPATIENT
Start: 2019-03-26 | End: 2019-05-27 | Stop reason: SDUPTHER

## 2019-03-26 RX ORDER — DOCUSATE SODIUM 100 MG/1
100 CAPSULE, LIQUID FILLED ORAL
Qty: 60 CAP | Refills: 11 | Status: SHIPPED | OUTPATIENT
Start: 2019-03-26 | End: 2021-08-29

## 2019-03-26 RX ORDER — HYDROCHLOROTHIAZIDE 12.5 MG/1
12.5 TABLET ORAL DAILY
Qty: 30 TAB | Refills: 6 | Status: SHIPPED | OUTPATIENT
Start: 2019-03-26

## 2019-03-26 RX ORDER — LOSARTAN POTASSIUM AND HYDROCHLOROTHIAZIDE 12.5; 5 MG/1; MG/1
1 TABLET ORAL DAILY
Qty: 30 TAB | Refills: 0 | Status: CANCELLED | OUTPATIENT
Start: 2019-03-26

## 2019-03-26 RX ORDER — LOSARTAN POTASSIUM AND HYDROCHLOROTHIAZIDE 12.5; 5 MG/1; MG/1
TABLET ORAL
Refills: 0 | COMMUNITY
Start: 2019-03-05 | End: 2019-03-26 | Stop reason: ALTCHOICE

## 2019-03-26 RX ORDER — ERGOCALCIFEROL 1.25 MG/1
50000 CAPSULE ORAL
Qty: 4 CAP | Refills: 11 | Status: SHIPPED | OUTPATIENT
Start: 2019-03-26 | End: 2021-08-29

## 2019-03-26 RX ORDER — CITALOPRAM 10 MG/1
10 TABLET ORAL DAILY
Qty: 30 TAB | Refills: 1 | Status: SHIPPED | OUTPATIENT
Start: 2019-03-26 | End: 2021-08-29

## 2019-03-26 NOTE — PROGRESS NOTES
Pt in for visit. Venipuncture done in left AC and blood specimen obtained. Pt tolerated well, no comps. See blood collection.

## 2019-03-26 NOTE — PROGRESS NOTES
OFFICE NOTE    Kaleb Newton is a 80 y.o. female presenting today for office visit. 3/26/2019  12:50 PM    Chief Complaint   Patient presents with    Annual Wellness Visit         Headache    Fatigue       HPI: Here today for follow up on chronic conditions, and would like to discuss multiple complaints. Has not been seen in a year. Due for Medicare Wellness but time constraints prohibit completion today. Last routine labs completed 1/2018. Follows with hematology/oncology. HTN/LVH: Chronic in nature- taking Losartan/HCTZ. She states that her daughter has been checking her BP at home and states that it has been running high but does not recall numbers. Has seen cardiology in the past- last ECHO 2012 with EJF 74% with mild LVH. CKD/Anemia/Vit D deficiency/ Myelodysplastic Syndrome: Follows with Dr José Luis Rodrigues- previously on Procrit- not sure if she is still on. On Vit D 50,000 units weekly- feels better when taking continuously. Today she complains of headaches, which she reports is a chronic problem. They are unprovoked, occasional, and relieved by Tylenol. She occasionally has some constipation that has gotten worse since she hasnt been as careful with diet and exercise. Her main complaint today is fatigue, some depression and anxiety. She attributes the mood issues with grief from the passing of her brother last year. Their relationship was very close and she hasnt had interest in things or energy like she used to. She verbalizes that her quality of life with these aggravating symptoms is not the best and states she would like to get out of the low point she is at. Review of Systems   Constitutional: Positive for fatigue. Negative for chills and fever. HENT: Negative for congestion, ear pain, facial swelling, postnasal drip, rhinorrhea, sinus pressure, sinus pain, sneezing and sore throat. Eyes: Negative for pain, discharge, itching and visual disturbance.    Respiratory: Negative for cough, shortness of breath and wheezing. Cardiovascular: Negative for chest pain, palpitations and leg swelling. Gastrointestinal: Negative for abdominal pain, constipation, diarrhea, nausea and vomiting. Genitourinary: Negative for difficulty urinating and frequency. Musculoskeletal: Negative for myalgias. Skin: Negative for rash. Neurological: Positive for headaches. Negative for dizziness, tremors, seizures, syncope and weakness. Psychiatric/Behavioral: Positive for dysphoric mood and sleep disturbance. Negative for decreased concentration, hallucinations, self-injury and suicidal ideas. The patient is nervous/anxious.           PHQ Screening   3 most recent PHQ Screens 3/26/2019   Little interest or pleasure in doing things Not at all   Feeling down, depressed, irritable, or hopeless Not at all   Total Score PHQ 2 0         History  Past Medical History:   Diagnosis Date    Anemia     CKD (chronic kidney disease)     HTN (hypertension)     LVH (left ventricular hypertrophy) 2012    noted on ECHO, mild- EJF 74%    Myelodysplastic syndrome (HCC)     Vitamin D deficiency        Past Surgical History:   Procedure Laterality Date    HX CATARACT REMOVAL      Bilateral cataract surgery    HX HYSTERECTOMY      HX PARTIAL THYROIDECTOMY      goiter removed       Social History     Socioeconomic History    Marital status:      Spouse name: Not on file    Number of children: Not on file    Years of education: Not on file    Highest education level: Not on file   Occupational History    Not on file   Social Needs    Financial resource strain: Not on file    Food insecurity:     Worry: Not on file     Inability: Not on file    Transportation needs:     Medical: Not on file     Non-medical: Not on file   Tobacco Use    Smoking status: Never Smoker    Smokeless tobacco: Never Used   Substance and Sexual Activity    Alcohol use: No    Drug use: No    Sexual activity: Not on file   Lifestyle    Physical activity:     Days per week: Not on file     Minutes per session: Not on file    Stress: Not on file   Relationships    Social connections:     Talks on phone: Not on file     Gets together: Not on file     Attends Judaism service: Not on file     Active member of club or organization: Not on file     Attends meetings of clubs or organizations: Not on file     Relationship status: Not on file    Intimate partner violence:     Fear of current or ex partner: Not on file     Emotionally abused: Not on file     Physically abused: Not on file     Forced sexual activity: Not on file   Other Topics Concern    Not on file   Social History Narrative    Not on file       Family History   Problem Relation Age of Onset    Diabetes Other     Hypertension Other         Positive family history for essential hypertension       Allergies   Allergen Reactions    Codeine Drowsiness       Current Outpatient Medications   Medication Sig Dispense Refill    ergocalciferol (ERGOCALCIFEROL) 50,000 unit capsule Take 1 Cap by mouth every seven (7) days. 4 Cap 11    aspirin (ASPIRIN LOW-STRENGTH) 81 mg chewable tablet Take 81 mg by mouth daily.  multivitamin (ONE A DAY) tablet Take 1 Tab by mouth daily. Indications: VITAMIN DEFICIENCY PREVENTION      diazepam (VALIUM) 2 mg tablet Take 2 mg by mouth as needed.  losartan-hydroCHLOROthiazide (HYZAAR) 50-12.5 mg per tablet   0           Advance Care Planning:   Patient was offered the opportunity to discuss advance care planning NO   Does patient have an Advance Directive: If no, did you provide information on [facility]? Patient Care Team:  Patient Care Team:  Dmitry Parks NP as PCP - General (Nurse Practitioner)  Mary Turcios MD (Oncology)        LABS:  None new to review    RADIOLOGY:  None new to review      Physical Exam   Constitutional: She is oriented to person, place, and time.  She appears well-developed and well-nourished. No distress. HENT:   Right Ear: Tympanic membrane, external ear and ear canal normal.   Left Ear: Tympanic membrane, external ear and ear canal normal.   Nose: Nose normal. No mucosal edema or rhinorrhea. Right sinus exhibits no maxillary sinus tenderness and no frontal sinus tenderness. Left sinus exhibits no maxillary sinus tenderness and no frontal sinus tenderness. Mouth/Throat: Uvula is midline, oropharynx is clear and moist and mucous membranes are normal. No oropharyngeal exudate or posterior oropharyngeal erythema. Eyes: Pupils are equal, round, and reactive to light. EOM are normal. Right eye exhibits no discharge. Left eye exhibits no discharge. Neck: Normal range of motion. Neck supple. No thyromegaly present. Cardiovascular: Normal rate and regular rhythm. Pulmonary/Chest: Effort normal and breath sounds normal. No respiratory distress. Abdominal: Soft. Bowel sounds are normal. There is no tenderness. Musculoskeletal: Edema: trace BLE. Lymphadenopathy:     She has no cervical adenopathy. Neurological: She is alert and oriented to person, place, and time. She exhibits normal muscle tone. Coordination and gait normal.   +sitting in wheelchair   Skin: Skin is warm and dry. No rash noted. Psychiatric: Her speech is normal and behavior is normal. Judgment normal. Her mood appears not anxious. She is not hyperactive, not withdrawn and not actively hallucinating. Cognition and memory are normal. She does not express impulsivity. She does not exhibit a depressed mood. She expresses no homicidal and no suicidal ideation.          Vitals:    03/26/19 1214 03/26/19 1215   BP: 175/86 166/80   Pulse: 74    Resp: 16    Temp: 97.4 °F (36.3 °C)    TempSrc: Oral    SpO2: 96%    Weight: 148 lb (67.1 kg)    Height: 5' 7\" (1.702 m)    PainSc:   5    PainLoc: Head          Assessment and Plan    Essential hypertension  *BP very elevated today- will separate BP pill and increase up on Losartan. Continue HCTZ 12.5 mg. Check labs. Follow up in a few weeks. - METABOLIC PANEL, COMPREHENSIVE; Future  - losartan (COZAAR) 100 mg tablet; Take 1 Tab by mouth daily. Dispense: 30 Tab; Refill: 1  - hydroCHLOROthiazide (HYDRODIURIL) 12.5 mg tablet; Take 1 Tab by mouth daily. Dispense: 30 Tab; Refill: 6    LVH (left ventricular hypertrophy)  *Needs improved control of BP    Stage 3 chronic kidney disease ([facility] [address]  *Check labs. Avoidance of NSAID necessary  - METABOLIC PANEL, COMPREHENSIVE; Future    Anemia associated with chronic renal failure  *Check labs. Used to get Procrit. Attempted to review last office note from hematology in 8/2018 but no documentation noted. - CBC W/O DIFF; Future    Vitamin D deficiency  *Check labs and continue weekly Vit D per patient preference  - ergocalciferol (ERGOCALCIFEROL) 50,000 unit capsule; Take 1 Cap by mouth every seven (7) days. Dispense: 4 Cap; Refill: 11  - VITAMIN D, 1, 25 DIHYDROXY; Future    Myelodysplastic syndrome (HCC)/ Chronic leukopenia  *Check CBC. Continue with heme/onc  - CBC W/O DIFF; Future    Nonintractable episodic headache, unspecified headache type  *She finds relief with TYlenol. No red flag symptoms today. Will work to improve BP    Fatigue, unspecified type  *Has been a chronic complaint. Check TSH. On Vit D weekly. - TSH 3RD GENERATION; Future    Constipation, unspecified constipation type  *Trial PRN stool softener. Advised on lifestyle modifications as well  - docusate sodium (COLACE) 100 mg capsule; Take 1 Cap by mouth two (2) times daily as needed for Constipation. Dispense: 60 Cap; Refill: 11    Grieving/ Reactive depression  *Discussed with patient and emotional support provided. Discussed pathophysiology and treatment options of condition. Advised on pharmacological management and counseling. Will trial low dose Celexa. Follow up in a few weeks. - citalopram (CELEXA) 10 mg tablet; Take 1 Tab by mouth daily.   Dispense: 30 Tab; Refill: 1    Screening for endocrine, metabolic and immunity disorder  - TSH 3RD GENERATION; Future  - URINALYSIS W/MICROSCOPIC; Future    Encounter for laboratory examination  - COLLECTION VENOUS BLOOD,VENIPUNCTURE          *Plan of care reviewed with patient. Patient in agreement with plan and expresses understanding. All questions answered and patient encouraged to call or RTO if further questions or concerns. Follow-up and Dispositions    · Return in about 1 month (around 4/26/2019) for chronic disease routine care and Medicare Wellness- 30 mins. Raina Smallwood

## 2019-03-28 LAB — 1,25(OH)2D3 SERPL-MCNC: 27.1 PG/ML (ref 19.9–79.3)

## 2019-03-29 ENCOUNTER — TELEPHONE (OUTPATIENT)
Dept: FAMILY MEDICINE CLINIC | Age: 84
End: 2019-03-29

## 2019-03-29 NOTE — TELEPHONE ENCOUNTER
Return call made to both contact aniket Medellin, left message that return call was made in reference to questions about how to take her BP meds, directions instruct to take 1 pill daily of each medication, she should probably take them in the morning with meals or after eating. May call back if there are any further question.

## 2019-03-29 NOTE — TELEPHONE ENCOUNTER
Patient want to know if she is suppose to take her bp meds at the same time stated [facility] put her on two type of bp meds and she is not sure how to take them.  Please contact patient back and advise when available

## 2019-04-10 RX ORDER — CEPHALEXIN 500 MG/1
500 CAPSULE ORAL 2 TIMES DAILY
Qty: 14 CAP | Refills: 0 | Status: SHIPPED | OUTPATIENT
Start: 2019-04-10 | End: 2019-04-17

## 2019-05-27 DIAGNOSIS — I10 ESSENTIAL HYPERTENSION: ICD-10-CM

## 2019-05-29 RX ORDER — LOSARTAN POTASSIUM 100 MG/1
100 TABLET ORAL DAILY
Qty: 30 TAB | Refills: 4 | Status: SHIPPED | OUTPATIENT
Start: 2019-05-29

## 2019-05-29 RX ORDER — LOSARTAN POTASSIUM 100 MG/1
100 TABLET ORAL DAILY
Qty: 30 TAB | Refills: 0 | Status: SHIPPED | OUTPATIENT
Start: 2019-05-29 | End: 2019-05-29 | Stop reason: SDUPTHER

## 2019-11-04 ENCOUNTER — APPOINTMENT (OUTPATIENT)
Dept: PHYSICAL THERAPY | Age: 84
End: 2019-11-04

## 2019-11-15 ENCOUNTER — APPOINTMENT (OUTPATIENT)
Dept: PHYSICAL THERAPY | Age: 84
End: 2019-11-15

## 2020-05-19 ENCOUNTER — TELEPHONE (OUTPATIENT)
Dept: FAMILY MEDICINE CLINIC | Age: 85
End: 2020-05-19

## 2020-05-28 DIAGNOSIS — E55.9 VITAMIN D DEFICIENCY: ICD-10-CM

## 2020-05-28 RX ORDER — ERGOCALCIFEROL 1.25 MG/1
50000 CAPSULE ORAL
Qty: 4 CAP | Refills: 11 | OUTPATIENT
Start: 2020-05-28

## 2020-05-28 NOTE — TELEPHONE ENCOUNTER
Appointment required- not seen in over a year. Will need appointment/labs before refill. Will route to CMA to communicate to patient.

## 2020-05-28 NOTE — TELEPHONE ENCOUNTER
Spoke with patients daughter-she stated that she will check into this with the pharmacy not sure why they called. I advised her that the patient would need to have labs and a visit before we could fill it.   She verbally understood

## 2020-05-28 NOTE — TELEPHONE ENCOUNTER
Requested Prescriptions     Pending Prescriptions Disp Refills    ergocalciferol (ERGOCALCIFEROL) 1,250 mcg (50,000 unit) capsule 4 Cap 11     Sig: Take 1 Cap by mouth every seven (7) days.      Last office visit 3/29/19  No upcoming appointment scheduled  Last prescribed 3/29/19

## 2020-11-16 DIAGNOSIS — E55.9 VITAMIN D DEFICIENCY: ICD-10-CM

## 2020-11-16 NOTE — TELEPHONE ENCOUNTER
Requested Prescriptions     Pending Prescriptions Disp Refills    ergocalciferol (ERGOCALCIFEROL) 1,250 mcg (50,000 unit) capsule 4 Cap 11     Sig: Take 1 Cap by mouth every seven (7) days.

## 2020-11-19 RX ORDER — ERGOCALCIFEROL 1.25 MG/1
50000 CAPSULE ORAL
Qty: 4 CAP | Refills: 11 | OUTPATIENT
Start: 2020-11-19

## 2020-12-16 ENCOUNTER — TELEPHONE (OUTPATIENT)
Dept: FAMILY MEDICINE CLINIC | Age: 85
End: 2020-12-16

## 2020-12-16 NOTE — TELEPHONE ENCOUNTER
[address] Refill Request Fax Document      Vitamin D2 1.25mg/50,000 Unit  Qty; 4.000  Take 1 capsule by mouth every week

## 2021-08-23 ENCOUNTER — TELEPHONE (OUTPATIENT)
Dept: FAMILY MEDICINE CLINIC | Age: 86
End: 2021-08-23

## 2021-08-23 NOTE — TELEPHONE ENCOUNTER
Pt stated she was called by the nurse but did not know why.  Please contact pt when available on this matter

## 2021-08-26 ENCOUNTER — OFFICE VISIT (OUTPATIENT)
Dept: FAMILY MEDICINE CLINIC | Age: 86
End: 2021-08-26
Payer: MEDICARE

## 2021-08-26 VITALS
SYSTOLIC BLOOD PRESSURE: 137 MMHG | HEART RATE: 81 BPM | TEMPERATURE: 96.4 F | RESPIRATION RATE: 16 BRPM | WEIGHT: 137 LBS | HEIGHT: 67 IN | OXYGEN SATURATION: 97 % | BODY MASS INDEX: 21.5 KG/M2 | DIASTOLIC BLOOD PRESSURE: 74 MMHG

## 2021-08-26 DIAGNOSIS — Z13.31 SCREENING FOR DEPRESSION: ICD-10-CM

## 2021-08-26 DIAGNOSIS — R63.4 WEIGHT LOSS: ICD-10-CM

## 2021-08-26 DIAGNOSIS — R53.1 WEAKNESS GENERALIZED: ICD-10-CM

## 2021-08-26 DIAGNOSIS — E07.9 THYROID DISORDER: ICD-10-CM

## 2021-08-26 DIAGNOSIS — Z00.00 MEDICARE ANNUAL WELLNESS VISIT, SUBSEQUENT: ICD-10-CM

## 2021-08-26 DIAGNOSIS — R51.9 NONINTRACTABLE HEADACHE, UNSPECIFIED CHRONICITY PATTERN, UNSPECIFIED HEADACHE TYPE: ICD-10-CM

## 2021-08-26 DIAGNOSIS — E55.9 VITAMIN D DEFICIENCY: ICD-10-CM

## 2021-08-26 DIAGNOSIS — I10 ESSENTIAL HYPERTENSION: Primary | ICD-10-CM

## 2021-08-26 DIAGNOSIS — D46.9 MYELODYSPLASTIC SYNDROME (HCC): ICD-10-CM

## 2021-08-26 DIAGNOSIS — R63.0 POOR APPETITE: ICD-10-CM

## 2021-08-26 DIAGNOSIS — R60.0 PEDAL EDEMA: ICD-10-CM

## 2021-08-26 PROCEDURE — G8420 CALC BMI NORM PARAMETERS: HCPCS | Performed by: FAMILY MEDICINE

## 2021-08-26 PROCEDURE — G0444 DEPRESSION SCREEN ANNUAL: HCPCS | Performed by: FAMILY MEDICINE

## 2021-08-26 PROCEDURE — G8536 NO DOC ELDER MAL SCRN: HCPCS | Performed by: FAMILY MEDICINE

## 2021-08-26 PROCEDURE — 1090F PRES/ABSN URINE INCON ASSESS: CPT | Performed by: FAMILY MEDICINE

## 2021-08-26 PROCEDURE — 99215 OFFICE O/P EST HI 40 MIN: CPT | Performed by: FAMILY MEDICINE

## 2021-08-26 PROCEDURE — G8510 SCR DEP NEG, NO PLAN REQD: HCPCS | Performed by: FAMILY MEDICINE

## 2021-08-26 PROCEDURE — G8427 DOCREV CUR MEDS BY ELIG CLIN: HCPCS | Performed by: FAMILY MEDICINE

## 2021-08-26 PROCEDURE — 1101F PT FALLS ASSESS-DOCD LE1/YR: CPT | Performed by: FAMILY MEDICINE

## 2021-08-26 PROCEDURE — G0439 PPPS, SUBSEQ VISIT: HCPCS | Performed by: FAMILY MEDICINE

## 2021-08-26 NOTE — PROGRESS NOTES
This is the Subsequent Medicare Annual Wellness Exam, performed 12 months or more after the Initial AWV or the last Subsequent AWV    I have reviewed the patient's medical history in detail and updated the computerized patient record. Chief Complaint   Patient presents with   [address]     headache    Annual Wellness Visit     1. Have you been to the ER, urgent care clinic since your last visit? Hospitalized since your last visit? No    2. Have you seen or consulted any other health care providers outside of the [address] [place] [address] [facility] since your last visit? Include any pap smears or colon screening. No    Assessment/Plan   Education and counseling provided:  Are appropriate based on today's review and evaluation  Pneumococcal Vaccine  Bone mass measurement (DEXA)    1. Medicare annual wellness visit, subsequent    2. Screening for depression  - [address]  ANNUAL       Depression Risk Factor Screening     3 most recent PHQ Screens 8/26/2021   Little interest or pleasure in doing things Not at all   Feeling down, depressed, irritable, or hopeless Several days   Total Score PHQ 2 1   Trouble falling or staying asleep, or sleeping too much Not at all   Feeling tired or having little energy Several days   Poor appetite, weight loss, or overeating Several days   Feeling bad about yourself - or that you are a failure or have let yourself or your family down Not at all   Trouble concentrating on things such as school, work, reading, or watching TV Not at all   Moving or speaking so slowly that other people could have noticed; or the opposite being so fidgety that others notice Not at all   How difficult have these problems made it for you to do your work, take care of your home and get along with others Not difficult at all   8 minutes taken on depression screening.     Alcohol Risk Screen    Do you average more than 1 drink per night or more than 7 drinks a week: No    On any one occasion in the past three months have you have had more than 3 drinks containing alcohol:  No        Functional Ability and Level of Safety     1. Was the patient's timed Up and GO test unsteady or longer than 30 seconds? No  2. Does the patient need help with the phone, transportation, shopping, preparing meals, housework, laundry, medications or managing money? No  3. Does the patients' home have rugs in the hallway, lack grab bars in the bathroom, lack handrails on the stairs or have poor lighting? No  4. Have you noticed any hearing difficulties? No  Hearing Evaluation:      Hearing: Hearing is good. Activities of Daily Living: The home contains: handrails and grab bars  Patient needs help with:  transportation, shopping, preparing meals, laundry, housework, managing medications, managing money and walking      Ambulation: with difficulty, uses a cane and walker     Fall Risk:  Fall Risk Assessment, last 12 mths 8/26/2021   Able to walk? Yes   Fall in past 12 months? 0   Do you feel unsteady?  0   Are you worried about falling 0      Abuse Screen:  Patient is not abused       Cognitive Screening    Has your family/caregiver stated any concerns about your memory: no     Cognitive Screening: Normal - Verbal Fluency Test    Health Maintenance Due     Health Maintenance Due   Topic Date Due    COVID-19 Vaccine (1) Never done    DTaP/Tdap/Td series (1 - Tdap) Never done    Shingrix Vaccine Age 50> (1 of 2) Never done    Bone Densitometry (Dexa) Screening  Never done    Pneumococcal 65+ years (1 of 1 - PPSV23) Never done    Medicare Yearly Exam  01/25/2019       Patient Care Team   Patient Care Team:  Kaela Hansen NP as PCP - General (Nurse Practitioner)  Elba Ruth MD (Oncology)    History   Mardee Leaver comes in for Medicare wellness exam.    Patient Active Problem List   Diagnosis Code    Vitamin D deficiency E55.9    Anemia associated with chronic renal failure N18.9, D63.1    Myelodysplastic syndrome (HCC) D46.9    Chronic leukopenia D72.819    HTN (hypertension) I10    LVH (left ventricular hypertrophy) I51.7    CKD (chronic kidney disease) N18.9     Past Medical History:   Diagnosis Date    Anemia     CKD (chronic kidney disease)     HTN (hypertension)     LVH (left ventricular hypertrophy) 2012    noted on ECHO, mild- EJF 74%    Myelodysplastic syndrome (HCC)     Vitamin D deficiency       Past Surgical History:   Procedure Laterality Date    HX CATARACT REMOVAL      Bilateral cataract surgery    HX HYSTERECTOMY      HX PARTIAL THYROIDECTOMY      goiter removed     Current Outpatient Medications   Medication Sig Dispense Refill    losartan (COZAAR) 100 mg tablet Take 1 Tab by mouth daily. 30 Tab 4    hydroCHLOROthiazide (HYDRODIURIL) 12.5 mg tablet Take 1 Tab by mouth daily. 30 Tab 6    multivitamin (ONE A DAY) tablet Take 1 Tab by mouth daily. Indications: VITAMIN DEFICIENCY PREVENTION      ergocalciferol (ERGOCALCIFEROL) 50,000 unit capsule Take 1 Cap by mouth every seven (7) days. (Patient not taking: Reported on 8/26/2021) 4 Cap 11    docusate sodium (COLACE) 100 mg capsule Take 1 Cap by mouth two (2) times daily as needed for Constipation. (Patient not taking: Reported on 8/26/2021) 60 Cap 11    citalopram (CELEXA) 10 mg tablet Take 1 Tab by mouth daily. (Patient not taking: Reported on 8/26/2021) 30 Tab 1    aspirin (ASPIRIN LOW-STRENGTH) 81 mg chewable tablet Take 81 mg by mouth daily.  (Patient not taking: Reported on 8/26/2021)       Allergies   Allergen Reactions    Codeine Drowsiness       Family History   Problem Relation Age of Onset    Diabetes Other     Hypertension Other         Positive family history for essential hypertension     Social History     Tobacco Use    Smoking status: Never Smoker    Smokeless tobacco: Never Used   Substance Use Topics    Alcohol use: No   I have discussed the diagnosis with the patient and the intended plan of care as seen in the above orders. The patient has received an after-visit summary and questions were answered concerning future plans. I have discussed medication, side effects, and warnings with the patient in detail. The patient verbalized understanding and is in agreement with the plan of care. The patient will contact the office with any additional concerns. Personalized preventative plan of care was discussed, printed and given to patient.     Andrew Dumont MD

## 2021-08-26 NOTE — PROGRESS NOTES
ROXY  Tommie Frey comes in for f/u care. Malaise and fatigue: Patient has generalized malaise, fatigue and also weakness. This has been ongoing for months. Feels weakness in both legs and has trouble standing and walking. She usually gets around using a cane but needed to use a wheelchair today when coming into the office. She denies fever or chills. I will check labs. HTN: Patient is in with her daughter who states that her blood pressure has been elevated in the mornings but later on during the day it is stable. She is on losartan, HCTZ. Blood pressure currently is stable. Discussed keeping a blood pressure log especially in the mornings and letting us know next week what the numbers are. If still elevated we could always go up on her HCTZ to 25 mg daily. With elevated blood pressure she has occasional headache. Denies changes in vision or focal weakness. Hearing loss: Patient has bilateral hearing loss and occasional tinnitus. Denies ear pain or ear discharge. May need to have hearing evaluation by ENT. Headache: Patient has headaches that come on and off especially in the morning. These are new in onset. Occasionally has changes in vision. He has taken over-the-counter medication with transient relief of the headache. She wonders whether it is associated with high blood pressure as her numbers are elevated in the morning. Given the new onset headaches I will order head CT scan. Weight loss: Patient has noted weight loss. She has been chronic for her over the past year. MDS: Patient has a history of MDS with resultant anemia. Given her fatigue, generalized weakness this could be due to anemia. I will recheck labs. Hypovitaminosis D: Patient has a history of hypovitaminosis D. She is not on any replacement therapy at the moment. I will recheck labs.         Past Medical History  Past Medical History:   Diagnosis Date    Anemia     CKD (chronic kidney disease)     HTN (hypertension)     LVH (left ventricular hypertrophy) 2012    noted on ECHO, mild- EJF 74%    Myelodysplastic syndrome (HCC)     Vitamin D deficiency        Surgical History  Past Surgical History:   Procedure Laterality Date    HX CATARACT REMOVAL      Bilateral cataract surgery    HX HYSTERECTOMY      HX PARTIAL THYROIDECTOMY      goiter removed        Medications  Current Outpatient Medications   Medication Sig Dispense Refill    losartan (COZAAR) 100 mg tablet Take 1 Tab by mouth daily. 30 Tab 4    hydroCHLOROthiazide (HYDRODIURIL) 12.5 mg tablet Take 1 Tab by mouth daily. 30 Tab 6    multivitamin (ONE A DAY) tablet Take 1 Tab by mouth daily. Indications: VITAMIN DEFICIENCY PREVENTION      ergocalciferol (ERGOCALCIFEROL) 50,000 unit capsule Take 1 Cap by mouth every seven (7) days. (Patient not taking: Reported on 8/26/2021) 4 Cap 11    docusate sodium (COLACE) 100 mg capsule Take 1 Cap by mouth two (2) times daily as needed for Constipation. (Patient not taking: Reported on 8/26/2021) 60 Cap 11    citalopram (CELEXA) 10 mg tablet Take 1 Tab by mouth daily. (Patient not taking: Reported on 8/26/2021) 30 Tab 1    aspirin (ASPIRIN LOW-STRENGTH) 81 mg chewable tablet Take 81 mg by mouth daily.  (Patient not taking: Reported on 8/26/2021)         Allergies  Allergies   Allergen Reactions    Codeine Drowsiness       Family History  Family History   Problem Relation Age of Onset    Diabetes Other     Hypertension Other         Positive family history for essential hypertension       Social History  Social History     Socioeconomic History    Marital status:      Spouse name: Not on file    Number of children: Not on file    Years of education: Not on file    Highest education level: Not on file   Occupational History    Not on file   Tobacco Use    Smoking status: Never Smoker    Smokeless tobacco: Never Used   Vaping Use    Vaping Use: Never used   Substance and Sexual Activity    Alcohol use: No    Drug use: No    Sexual activity: Not Currently   Other Topics Concern    Not on file   Social History Narrative    Not on file     Social Determinants of Health     Financial Resource Strain:     Difficulty of Paying Living Expenses:    Food Insecurity:     Worried About Running Out of Food in the Last Year:     [address] in the Last Year:    Transportation Needs:     Lack of Transportation (Medical):  Lack of Transportation (Non-Medical):    Physical Activity:     Days of Exercise per Week:     Minutes of Exercise per Session:    Stress:     Feeling of Stress :    Social Connections:     Frequency of Communication with Friends and Family:     Frequency of Social Gatherings with Friends and Family:     Attends Restoration[employer] Services:     Active Member of Clubs or Organizations:     Attends Club or Organization Meetings:     Marital Status:    Intimate Partner Violence:     Fear of Current or Ex-Partner:     Emotionally Abused:     Physically Abused:     Sexually Abused:        Review of Systems  Review of Systems - Review of all systems is negative except as noted above in the HPI. Vital Signs  Visit Vitals  /74 (BP 1 Location: Left upper arm, BP Patient Position: Sitting, BP Cuff Size: Adult)   Pulse 81   Temp (!) 96.4 °F (35.8 °C) (Oral)   Resp 16   Ht 5' 7\" (1.702 m)   Wt 137 lb (62.1 kg)   SpO2 97%   BMI 21.46 kg/m²       Physical Exam  Physical Examination: General appearance - oriented to person, place, and time and acyanotic, in no respiratory distress  Mental status - affect appropriate to mood  Chest - decreased air entry noted bilateral lung bases.   Heart - systolic murmur 2/6 at lower left sternal border  Abdomen - no rebound tenderness noted  Back exam - limited range of motion  Neurological - abnormal neurological exam unchanged from prior examinations  Musculoskeletal - osteoarthritic changes noted in both hands  Extremities - intact peripheral pulses      Results  Results for orders placed or performed during the hospital encounter of 03/26/19   CBC W/O DIFF   Result Value Ref Range    WBC 4.2 (L) 4.6 - 13.2 K/uL    RBC 3.17 (L) 4.20 - 5.30 M/uL    HGB 10.6 (L) 12.0 - 16.0 g/dL    HCT 33.3 (L) 35.0 - 45.0 %    .0 (H) 74.0 - 97.0 FL    MCH 33.4 24.0 - 34.0 PG    MCHC 31.8 31.0 - 37.0 g/dL    RDW 12.1 11.6 - 14.5 %    PLATELET 388 916 - 405 K/uL    MPV 10.1 9.2 - 49.5 FL   METABOLIC PANEL, COMPREHENSIVE   Result Value Ref Range    Sodium 142 136 - 145 mmol/L    Potassium 4.3 3.5 - 5.5 mmol/L    Chloride 107 100 - 108 mmol/L    CO2 27 21 - 32 mmol/L    Anion gap 8 3.0 - 18 mmol/L    Glucose 90 74 - 99 mg/dL    BUN 22 (H) 7.0 - 18 MG/DL    Creatinine 1.32 (H) 0.6 - 1.3 MG/DL    BUN/Creatinine ratio 17 12 - 20      GFR est AA 46 (L) >60 ml/min/1.73m2    GFR est non-AA 38 (L) >60 ml/min/1.73m2    Calcium 8.5 8.5 - 10.1 MG/DL    Bilirubin, total 0.8 0.2 - 1.0 MG/DL    ALT (SGPT) 16 13 - 56 U/L    AST (SGOT) 23 15 - 37 U/L    Alk. phosphatase 55 45 - 117 U/L    Protein, total 7.1 6.4 - 8.2 g/dL    Albumin 3.9 3.4 - 5.0 g/dL    Globulin 3.2 2.0 - 4.0 g/dL    A-G Ratio 1.2 0.8 - 1.7     TSH 3RD GENERATION   Result Value Ref Range    TSH 2.10 0.36 - 3.74 uIU/mL   URINALYSIS W/MICROSCOPIC   Result Value Ref Range    Color YELLOW      Appearance CLEAR      Specific gravity 1.016 1.005 - 1.030      pH (UA) 6.0 5.0 - 8.0      Protein TRACE (A) NEG mg/dL    Glucose NEGATIVE  NEG mg/dL    Ketone NEGATIVE  NEG mg/dL    Bilirubin NEGATIVE  NEG      Blood SMALL (A) NEG      Urobilinogen 0.2 0.2 - 1.0 EU/dL    Nitrites NEGATIVE  NEG      Leukocyte Esterase LARGE (A) NEG      WBC TOO NUMEROUS TO COUNT 0 - 4 /hpf    RBC 3 to 5 0 - 5 /hpf    Epithelial cells FEW 0 - 5 /lpf    Bacteria 3+ (A) NEG /hpf   VITAMIN D, 1, 25 DIHYDROXY   Result Value Ref Range    Calcitriol (Vit D 1, 25 di-OH) 27.1 19.9 - 79.3 pg/mL       ASSESSMENT and PLAN    ICD-10-CM ICD-9-CM    1.  Essential hypertension  I10 401.9 CBC WITH AUTOMATED DIFF      METABOLIC PANEL, COMPREHENSIVE      LIPID PANEL   2. Nonintractable headache, unspecified chronicity pattern, unspecified headache type  R51.9 784.0 CT HEAD WO CONT   3. Myelodysplastic syndrome ([facility] [address]  D46.9 238.75    4. Vitamin D deficiency  E55.9 268.9 VITAMIN D, 25 HYDROXY   5. Pedal edema  R60.0 782.3    6. Weakness generalized  R53.1 780.79    7. Weight loss  R63.4 783.21    8. Poor appetite  R63.0 783.0    9. Thyroid disorder  E07.9 246.9 TSH 3RD GENERATION   10. Medicare annual wellness visit, subsequent  Z00.00 V70.0    11. Screening for depression  Z13.31 V79.0 [address]     lab results and schedule of future lab studies reviewed with patient  reviewed diet, exercise and weight control  cardiovascular risk and specific lipid/LDL goals reviewed  reviewed medications and side effects in detail  radiology results and schedule of future radiology studies reviewed with patient      I have discussed the diagnosis with the patient and the intended plan of care as seen in the above orders. The patient has received an after-visit summary and questions were answered concerning future plans. I have discussed medication, side effects, and warnings with the patient in detail. The patient verbalized understanding and is in agreement with the plan of care. The patient will contact the office with any additional concerns. I spent at least 44 minutes on this visit with this established patient. Arely Austin MD    PLEASE NOTE:   This document has been produced using voice recognition software.  Unrecognized errors in transcription may be present

## 2021-08-26 NOTE — PATIENT INSTRUCTIONS
Medicare Wellness Visit, Female     The best way to live healthy is to have a lifestyle where you eat a well-balanced diet, exercise regularly, limit alcohol use, and quit all forms of tobacco/nicotine, if applicable. Regular preventive services are another way to keep healthy. Preventive services (vaccines, screening tests, monitoring & exams) can help personalize your care plan, which helps you manage your own care. Screening tests can find health problems at the earliest stages, when they are easiest to treat. [facility] follows the current, evidence-based guidelines published by the [facility] ([facility]) when recommending preventive services for our patients. Because we follow these guidelines, sometimes recommendations change over time as research supports it. (For example, mammograms used to be recommended annually. Even though Medicare will still pay for an annual mammogram, the newer guidelines recommend a mammogram every two years for women of average risk). Of course, you and your doctor may decide to screen more often for some diseases, based on your risk and your co-morbidities (chronic disease you are already diagnosed with). Preventive services for you include:  - Medicare offers their members a free annual wellness visit, which is time for you and your primary care provider to discuss and plan for your preventive service needs. Take advantage of this benefit every year!  -All adults over the age of 72 should receive the recommended pneumonia vaccines. Current USPSTF guidelines recommend a series of two vaccines for the best pneumonia protection.   -All adults should have a flu vaccine yearly and a tetanus vaccine every 10 years.   -All adults age 48 and older should receive the shingles vaccines (series of two vaccines).       -All adults age 38-68 who are overweight should have a diabetes screening test once every three years.   -All adults born between 80 and 1965 should be screened once for Hepatitis C.  -Other screening tests and preventive services for persons with diabetes include: an eye exam to screen for diabetic retinopathy, a kidney function test, a foot exam, and stricter control over your cholesterol.   -Cardiovascular screening for adults with routine risk involves an electrocardiogram (ECG) at intervals determined by your doctor.   -Colorectal cancer screenings should be done for adults age 54-65 with no increased risk factors for colorectal cancer. There are a number of acceptable methods of screening for this type of cancer. Each test has its own benefits and drawbacks. Discuss with your doctor what is most appropriate for you during your annual wellness visit. The different tests include: colonoscopy (considered the best screening method), a fecal occult blood test, a fecal DNA test, and sigmoidoscopy.    -A bone mass density test is recommended when a woman turns 65 to screen for osteoporosis. This test is only recommended one time, as a screening. Some providers will use this same test as a disease monitoring tool if you already have osteoporosis. -Breast cancer screenings are recommended every other year for women of normal risk, age 54-69.  -Cervical cancer screenings for women over age 72 are only recommended with certain risk factors.      Here is a list of your current Health Maintenance items (your personalized list of preventive services) with a due date:  Health Maintenance Due   Topic Date Due    COVID-19 Vaccine (1) Never done    DTaP/Tdap/Td  (1 - Tdap) Never done    Shingles Vaccine (1 of 2) Never done    Bone Mineral Density   Never done    Pneumococcal Vaccine (1 of 1 - PPSV23) Never done    Annual Well Visit  01/25/2019         Medicare Wellness Visit, Female     The best way to live healthy is to have a lifestyle where you eat a well-balanced diet, exercise regularly, limit alcohol use, and quit all forms of tobacco/nicotine, if applicable. Regular preventive services are another way to keep healthy. Preventive services (vaccines, screening tests, monitoring & exams) can help personalize your care plan, which helps you manage your own care. Screening tests can find health problems at the earliest stages, when they are easiest to treat. [facility] follows the current, evidence-based guidelines published by the [facility] ([facility]) when recommending preventive services for our patients. Because we follow these guidelines, sometimes recommendations change over time as research supports it. (For example, mammograms used to be recommended annually. Even though Medicare will still pay for an annual mammogram, the newer guidelines recommend a mammogram every two years for women of average risk). Of course, you and your doctor may decide to screen more often for some diseases, based on your risk and your co-morbidities (chronic disease you are already diagnosed with). Preventive services for you include:  - Medicare offers their members a free annual wellness visit, which is time for you and your primary care provider to discuss and plan for your preventive service needs. Take advantage of this benefit every year!  -All adults over the age of 72 should receive the recommended pneumonia vaccines. Current USPSTF guidelines recommend a series of two vaccines for the best pneumonia protection.   -All adults should have a flu vaccine yearly and a tetanus vaccine every 10 years.   -All adults age 48 and older should receive the shingles vaccines (series of two vaccines).       -All adults age 38-68 who are overweight should have a diabetes screening test once every three years.   -All adults born between 80 and 1965 should be screened once for Hepatitis C.  -Other screening tests and preventive services for persons with diabetes include: an eye exam to screen for diabetic retinopathy, a kidney function test, a foot exam, and stricter control over your cholesterol.   -Cardiovascular screening for adults with routine risk involves an electrocardiogram (ECG) at intervals determined by your doctor.   -Colorectal cancer screenings should be done for adults age 54-65 with no increased risk factors for colorectal cancer. There are a number of acceptable methods of screening for this type of cancer. Each test has its own benefits and drawbacks. Discuss with your doctor what is most appropriate for you during your annual wellness visit. The different tests include: colonoscopy (considered the best screening method), a fecal occult blood test, a fecal DNA test, and sigmoidoscopy.    -A bone mass density test is recommended when a woman turns 65 to screen for osteoporosis. This test is only recommended one time, as a screening. Some providers will use this same test as a disease monitoring tool if you already have osteoporosis. -Breast cancer screenings are recommended every other year for women of normal risk, age 54-69.  -Cervical cancer screenings for women over age 72 are only recommended with certain risk factors.      Here is a list of your current Health Maintenance items (your personalized list of preventive services) with a due date:  Health Maintenance Due   Topic Date Due    COVID-19 Vaccine (1) Never done    DTaP/Tdap/Td  (1 - Tdap) Never done    Shingles Vaccine (1 of 2) Never done    Bone Mineral Density   Never done    Pneumococcal Vaccine (1 of 1 - PPSV23) Never done

## 2021-08-26 NOTE — PROGRESS NOTES
Chief Complaint   Patient presents with    Establish Care    Headache     1. Have you been to the ER, urgent care clinic since your last visit? Hospitalized since your last visit? No    2. Have you seen or consulted any other health care providers outside of the [address] [place] [address] since your last visit? Include any pap smears or colon screening.  No

## 2021-10-27 ENCOUNTER — TELEPHONE (OUTPATIENT)
Dept: FAMILY MEDICINE CLINIC | Age: 86
End: 2021-10-27

## 2021-11-11 ENCOUNTER — HOSPITAL ENCOUNTER (OUTPATIENT)
Dept: LAB | Age: 86
Discharge: HOME | End: 2021-11-11
Payer: MEDICARE

## 2021-11-11 ENCOUNTER — APPOINTMENT (OUTPATIENT)
Dept: FAMILY MEDICINE CLINIC | Age: 86
End: 2021-11-11

## 2021-11-11 DIAGNOSIS — I10 ESSENTIAL HYPERTENSION: ICD-10-CM

## 2021-11-11 DIAGNOSIS — E07.9 THYROID DISORDER: ICD-10-CM

## 2021-11-11 DIAGNOSIS — E55.9 VITAMIN D DEFICIENCY: ICD-10-CM

## 2021-11-11 LAB
25(OH)D3 SERPL-MCNC: 40.1 NG/ML (ref 30–100)
ALBUMIN SERPL-MCNC: 3.5 G/DL (ref 3.4–5)
ALBUMIN/GLOB SERPL: 1.1 {RATIO} (ref 0.8–1.7)
ALP SERPL-CCNC: 48 U/L (ref 45–117)
ALT SERPL-CCNC: 12 U/L (ref 13–56)
ANION GAP SERPL CALC-SCNC: 4 MMOL/L (ref 3–18)
AST SERPL-CCNC: 18 U/L (ref 10–38)
BASOPHILS # BLD: 0 K/UL (ref 0–0.1)
BASOPHILS NFR BLD: 0 % (ref 0–2)
BILIRUB SERPL-MCNC: 0.8 MG/DL (ref 0.2–1)
BUN SERPL-MCNC: 21 MG/DL (ref 7–18)
BUN/CREAT SERPL: 15 (ref 12–20)
CALCIUM SERPL-MCNC: 8.7 MG/DL (ref 8.5–10.1)
CHLORIDE SERPL-SCNC: 111 MMOL/L (ref 100–111)
CHOLEST SERPL-MCNC: 185 MG/DL
CO2 SERPL-SCNC: 27 MMOL/L (ref 21–32)
CREAT SERPL-MCNC: 1.37 MG/DL (ref 0.6–1.3)
DIFFERENTIAL METHOD BLD: ABNORMAL
EOSINOPHIL # BLD: 0.1 K/UL (ref 0–0.4)
EOSINOPHIL NFR BLD: 2 % (ref 0–5)
ERYTHROCYTE [DISTWIDTH] IN BLOOD BY AUTOMATED COUNT: 12.5 % (ref 11.6–14.5)
GLOBULIN SER CALC-MCNC: 3.1 G/DL (ref 2–4)
GLUCOSE SERPL-MCNC: 93 MG/DL (ref 74–99)
HCT VFR BLD AUTO: 29.4 % (ref 35–45)
HDLC SERPL-MCNC: 47 MG/DL (ref 40–60)
HDLC SERPL: 3.9 {RATIO} (ref 0–5)
HGB BLD-MCNC: 9.3 G/DL (ref 12–16)
IMM GRANULOCYTES # BLD AUTO: 0 K/UL (ref 0–0.04)
IMM GRANULOCYTES NFR BLD AUTO: 0 % (ref 0–0.5)
LDLC SERPL CALC-MCNC: 109.2 MG/DL (ref 0–100)
LIPID PROFILE,FLP: ABNORMAL
LYMPHOCYTES # BLD: 1 K/UL (ref 0.9–3.6)
LYMPHOCYTES NFR BLD: 25 % (ref 21–52)
MCH RBC QN AUTO: 33.9 PG (ref 24–34)
MCHC RBC AUTO-ENTMCNC: 31.6 G/DL (ref 31–37)
MCV RBC AUTO: 107.3 FL (ref 78–100)
MONOCYTES # BLD: 0.3 K/UL (ref 0.05–1.2)
MONOCYTES NFR BLD: 7 % (ref 3–10)
NEUTS SEG # BLD: 2.6 K/UL (ref 1.8–8)
NEUTS SEG NFR BLD: 66 % (ref 40–73)
NRBC # BLD: 0 K/UL (ref 0–0.01)
NRBC BLD-RTO: 0 PER 100 WBC
PLATELET # BLD AUTO: 165 K/UL (ref 135–420)
PMV BLD AUTO: 9.9 FL (ref 9.2–11.8)
POTASSIUM SERPL-SCNC: 4 MMOL/L (ref 3.5–5.5)
PROT SERPL-MCNC: 6.6 G/DL (ref 6.4–8.2)
RBC # BLD AUTO: 2.74 M/UL (ref 4.2–5.3)
SODIUM SERPL-SCNC: 142 MMOL/L (ref 136–145)
TRIGL SERPL-MCNC: 144 MG/DL (ref ?–150)
TSH SERPL DL<=0.05 MIU/L-ACNC: 3.83 UIU/ML (ref 0.36–3.74)
VLDLC SERPL CALC-MCNC: 28.8 MG/DL
WBC # BLD AUTO: 4 K/UL (ref 4.6–13.2)

## 2021-11-11 PROCEDURE — 84443 ASSAY THYROID STIM HORMONE: CPT

## 2021-11-11 PROCEDURE — 36415 COLL VENOUS BLD VENIPUNCTURE: CPT

## 2021-11-11 PROCEDURE — 85025 COMPLETE CBC W/AUTO DIFF WBC: CPT

## 2021-11-11 PROCEDURE — 82306 VITAMIN D 25 HYDROXY: CPT

## 2021-11-11 PROCEDURE — 80061 LIPID PANEL: CPT

## 2021-11-11 PROCEDURE — 80053 COMPREHEN METABOLIC PANEL: CPT

## 2021-11-16 ENCOUNTER — TELEPHONE (OUTPATIENT)
Dept: FAMILY MEDICINE CLINIC | Age: 86
End: 2021-11-16

## 2021-11-17 DIAGNOSIS — N18.30 STAGE 3 CHRONIC KIDNEY DISEASE, UNSPECIFIED WHETHER STAGE 3A OR 3B CKD (HCC): Primary | ICD-10-CM

## 2021-11-17 DIAGNOSIS — D64.9 CHRONIC ANEMIA: ICD-10-CM

## 2021-11-17 RX ORDER — LEVOTHYROXINE SODIUM 25 UG/1
25 TABLET ORAL
Qty: 30 TABLET | Refills: 2 | Status: SHIPPED | OUTPATIENT
Start: 2021-11-17

## 2021-11-17 NOTE — PROGRESS NOTES
Please let patient know her hemoglobin is low. This indicates anemia. I will refer to the hematologist for evaluation and advice. Her thyroid test results indicate low thyroid. The levothyroxine to take 25 mg daily. We will recheck labs in 6-8 weeks. Her LDL cholesterol is slightly elevated. She should take a diet low in polysaturated fats. Her creatinine is slightly elevated. This indicates chronic kidney disease stage III. It means kidney function is slightly reduced. I would recommend she hold off on the hydrochlorothiazide for now. She should maintain adequate fluid intake. We will recheck labs in 6-8 weeks.   I will refer her to see the nephrologist.  Ashutosh Blackwood MD

## 2021-11-24 NOTE — TELEPHONE ENCOUNTER
Hey,     Pt. and Pt. Daughter called requesting results. After verifying identity I gave results. Pt. and Pt. daughter expressed concern about lab results. She really would like to talk to you directly. This is the number she left. ([phone])   Thank you.  Tonja Gallagher

## 2021-11-24 NOTE — TELEPHONE ENCOUNTER
Pt called to get the lab results. Please follow up when available. Ms Francois Zapata stated she really need the results. Please call [phone] to speak with the daughter of the pt

## 2021-11-29 NOTE — TELEPHONE ENCOUNTER
Pt daughter called and stated she needs to speak to Dr. Karla Cullen about her mothers test results and medication. She can be reached at [phone]. Please advise.  Thank you!!!

## 2021-11-29 NOTE — TELEPHONE ENCOUNTER
I called patient's daughter. Unable to leave voice message at the number given since voicemail is full. We will try and call again tomorrow.   Yennifer Addison MD

## 2021-12-01 ENCOUNTER — VIRTUAL VISIT (OUTPATIENT)
Dept: FAMILY MEDICINE CLINIC | Age: 86
End: 2021-12-01
Payer: MEDICARE

## 2021-12-01 ENCOUNTER — TELEPHONE (OUTPATIENT)
Dept: FAMILY MEDICINE CLINIC | Age: 86
End: 2021-12-01

## 2021-12-01 DIAGNOSIS — D46.9 MYELODYSPLASTIC SYNDROME (HCC): ICD-10-CM

## 2021-12-01 DIAGNOSIS — N18.30 STAGE 3 CHRONIC KIDNEY DISEASE, UNSPECIFIED WHETHER STAGE 3A OR 3B CKD (HCC): Primary | ICD-10-CM

## 2021-12-01 DIAGNOSIS — E07.9 THYROID DISORDER: ICD-10-CM

## 2021-12-01 DIAGNOSIS — I10 ESSENTIAL HYPERTENSION: ICD-10-CM

## 2021-12-01 DIAGNOSIS — N18.9 ANEMIA ASSOCIATED WITH CHRONIC RENAL FAILURE: ICD-10-CM

## 2021-12-01 DIAGNOSIS — D63.1 ANEMIA ASSOCIATED WITH CHRONIC RENAL FAILURE: ICD-10-CM

## 2021-12-01 PROCEDURE — 99443 PR PHYS/QHP TELEPHONE EVALUATION 21-30 MIN: CPT | Performed by: FAMILY MEDICINE

## 2021-12-01 RX ORDER — VITAMIN E 268 MG
400 CAPSULE ORAL DAILY
COMMUNITY

## 2021-12-01 RX ORDER — GLUCOSAMINE SULFATE 1500 MG
1000 POWDER IN PACKET (EA) ORAL DAILY
COMMUNITY

## 2021-12-01 NOTE — PROGRESS NOTES
Izabela Harden is a 80 y.o. female, evaluated via audio-only technology on 12/1/2021 for Other (Discuss labs done on 11/11/21 and thyroid medication)  . Assessment & Plan:       ICD-10-CM ICD-9-CM    1. Stage 3 chronic kidney disease, unspecified whether stage 3a or 3b CKD (HCC)  N18.30 585.3    2. Essential hypertension  I10 401.9    3. Myelodysplastic syndrome ([facility] [address]  D46.9 238.75    4. Thyroid disorder  E07.9 246.9    5. Anemia associated with chronic renal failure  N18.9 285.21     D63.1       12  Subjective:   Izabela Harden had follow-up visit. I did talk to the daughter during this particular visit. Thyroid disorder: Patient had labs done that showed elevated TSH. This is slight elevated. We discussed hypothyroidism. We will recheck labs. CKD: Patient has chronic kidney disease stage III. Plan is to avoid any nephrotoxic medications. I have placed a referral for to be seen by a nephrologist.  This has been chronic for the patient. Patient has been on HCTZ. We will withhold this for now. She should take adequate amount of fluid. Dyslipidemia: Patient has dyslipidemia with . Patient will intensify lifestyle and dietary modification. We will follow-up at next visit. Anemia: Patient has anemia. Hemoglobin 9.3. She will follow-up with the hematologist on this. Anemia has been chronic for her. We discussed macrocytic anemia and causes of the same. Patient has a history of MDS. Prior to Admission medications    Medication Sig Start Date End Date Taking? Authorizing Provider   vitamin E (AQUA GEMS) 400 unit capsule Take 400 Units by mouth daily. Yes Provider, Historical   cholecalciferol (Vitamin D3) 25 mcg (1,000 unit) cap Take 1,000 Units by mouth daily. Yes Provider, Historical   losartan (COZAAR) 100 mg tablet Take 1 Tab by mouth daily. 5/29/19  Yes Meredith Pry D, NP   hydroCHLOROthiazide (HYDRODIURIL) 12.5 mg tablet Take 1 Tab by mouth daily.  3/26/19  Yes Kira Reno, Kelli Isidro NP   multivitamin (ONE A DAY) tablet Take 1 Tab by mouth daily. Indications: VITAMIN DEFICIENCY PREVENTION   Yes Provider, Historical   levothyroxine (SYNTHROID) 25 mcg tablet Take 1 Tablet by mouth Daily (before breakfast). Patient not taking: Reported on 12/1/2021 11/17/21   Manny Haley MD ROS Review of all systems is negative except as noted above in the HPI. No flowsheet data found. Vito Kellogg, who was evaluated through a patient-initiated, synchronous (real-time) audio only encounter, and/or her healthcare decision maker, is aware that it is a billable service, with coverage as determined by her insurance carrier. She provided verbal consent to proceed: Yes. She has not had a related appointment within my department in the past 7 days or scheduled within the next 24 hours. On this date 12/01/2021 I have spent 30 minutes reviewing previous notes, test results and face to face (virtual) with the patient discussing the diagnosis and importance of compliance with the treatment plan as well as documenting on the day of the visit.     Guadalupe Apley, MD

## 2021-12-01 NOTE — TELEPHONE ENCOUNTER
Pt daughter called to let Dr. Sheela Mansfield know that Dr. Jana Decker is no longer taking new patients. Because they are bringing on new doctors. She would like for Dr. Sheela Mansfield to give her a call. Pt can be reached at [phone]. Please advise.  Thank you!!!

## 2021-12-02 NOTE — TELEPHONE ENCOUNTER
Spoke with patient daughter. Patient daughter stated she would like a new referral sent to a hematologist. Dr Melissa Barahona is no longer accepting new patients.

## 2022-03-20 PROBLEM — N18.9 CKD (CHRONIC KIDNEY DISEASE): Status: ACTIVE | Noted: 2018-01-27

## 2024-01-16 ENCOUNTER — HOME CARE VISIT (OUTPATIENT)
Age: 89
End: 2024-01-16

## 2024-01-16 ASSESSMENT — ENCOUNTER SYMPTOMS: HEMOPTYSIS: 0

## 2024-01-17 ENCOUNTER — HOME CARE VISIT (OUTPATIENT)
Age: 89
End: 2024-01-17

## 2024-01-18 ENCOUNTER — HOME CARE VISIT (OUTPATIENT)
Age: 89
End: 2024-01-18

## 2024-01-18 ASSESSMENT — ENCOUNTER SYMPTOMS: HEMOPTYSIS: 0

## 2024-01-18 NOTE — HOSPICE
Pt/family pursuing hospice: Yes    Admission dx approved by Hospice Medical Director: Alzheimer's dementia. Related comorbidities: debility, malnutrition, history of sepsis, pressure ulcers, PE/DVT, anemia. Unrelated comorbidities: nephrolithiasis   Anticipated hospital d/c date: 1/19; transport scheduled for 4pm  Discussion occurred with patient and/or family about preference of hospitalization once admitted to hospice: Yes  Reviewed and discussed hospice philosophy and keeping the patient comfortable in their residence: Yes  Discussion with patient/family regarding around the clock caregiver in place due to patient safety in the home once discharged: Yes     DME:  hospital bed, low air loss mattress, 5L O2 concentrator, OBT scheduled for delivery 1/19 between 9am-12pm  FYI: Daughter is reluctant CG; has been pt's CG's for 2 years in bedbound state; APS involved; pt is imminent; extended time reviewing hospice services with daughter, with IP CM present.

## 2024-01-19 ENCOUNTER — HOME CARE VISIT (OUTPATIENT)
Age: 89
End: 2024-01-19